# Patient Record
Sex: FEMALE | Race: WHITE | NOT HISPANIC OR LATINO | Employment: FULL TIME | URBAN - METROPOLITAN AREA
[De-identification: names, ages, dates, MRNs, and addresses within clinical notes are randomized per-mention and may not be internally consistent; named-entity substitution may affect disease eponyms.]

---

## 2023-12-08 ENCOUNTER — LAB REQUISITION (OUTPATIENT)
Dept: LAB | Facility: HOSPITAL | Age: 33
End: 2023-12-08

## 2023-12-08 DIAGNOSIS — R30.0 DYSURIA: ICD-10-CM

## 2023-12-08 PROCEDURE — 87086 URINE CULTURE/COLONY COUNT: CPT

## 2023-12-08 PROCEDURE — 87186 SC STD MICRODIL/AGAR DIL: CPT

## 2023-12-11 LAB — BACTERIA UR CULT: ABNORMAL

## 2024-02-13 ENCOUNTER — LAB (OUTPATIENT)
Dept: LAB | Facility: LAB | Age: 34
End: 2024-02-13
Payer: MEDICARE

## 2024-02-13 ENCOUNTER — OFFICE VISIT (OUTPATIENT)
Dept: OBSTETRICS AND GYNECOLOGY | Facility: CLINIC | Age: 34
End: 2024-02-13
Payer: MEDICARE

## 2024-02-13 VITALS
SYSTOLIC BLOOD PRESSURE: 100 MMHG | WEIGHT: 144 LBS | DIASTOLIC BLOOD PRESSURE: 60 MMHG | HEIGHT: 65 IN | BODY MASS INDEX: 23.99 KG/M2

## 2024-02-13 DIAGNOSIS — N92.6 IRREGULAR MENSES: ICD-10-CM

## 2024-02-13 DIAGNOSIS — R10.2 PELVIC PAIN: ICD-10-CM

## 2024-02-13 DIAGNOSIS — N92.6 IRREGULAR MENSES: Primary | ICD-10-CM

## 2024-02-13 PROBLEM — F33.2 SEVERE EPISODE OF RECURRENT MAJOR DEPRESSIVE DISORDER, WITHOUT PSYCHOTIC FEATURES (MULTI): Status: RESOLVED | Noted: 2018-01-08 | Resolved: 2024-02-13

## 2024-02-13 PROBLEM — N39.0 RECURRENT UTI: Status: RESOLVED | Noted: 2017-05-09 | Resolved: 2024-02-13

## 2024-02-13 PROBLEM — F32.A DEPRESSION: Status: ACTIVE | Noted: 2017-10-21

## 2024-02-13 PROBLEM — M79.18 MYOFASCIAL PAIN: Status: RESOLVED | Noted: 2017-03-27 | Resolved: 2024-02-13

## 2024-02-13 PROBLEM — F10.929 ALCOHOL INTOXICATION (CMS-HCC): Status: RESOLVED | Noted: 2024-02-13 | Resolved: 2024-02-13

## 2024-02-13 PROBLEM — F32.9 MAJOR DEPRESSIVE DISORDER: Status: ACTIVE | Noted: 2024-02-13

## 2024-02-13 PROBLEM — F41.1 GAD (GENERALIZED ANXIETY DISORDER): Status: ACTIVE | Noted: 2018-01-08

## 2024-02-13 PROBLEM — G89.4 CHRONIC PAIN ASSOCIATED WITH SIGNIFICANT PSYCHOSOCIAL DYSFUNCTION: Status: ACTIVE | Noted: 2017-03-27

## 2024-02-13 PROBLEM — F19.939: Status: RESOLVED | Noted: 2017-10-21 | Resolved: 2024-02-13

## 2024-02-13 PROBLEM — M79.7 FIBROMYALGIA: Status: ACTIVE | Noted: 2017-03-27

## 2024-02-13 LAB — TSH SERPL-ACNC: 1.45 MIU/L (ref 0.44–3.98)

## 2024-02-13 PROCEDURE — 83002 ASSAY OF GONADOTROPIN (LH): CPT

## 2024-02-13 PROCEDURE — 99203 OFFICE O/P NEW LOW 30 MIN: CPT | Performed by: NURSE PRACTITIONER

## 2024-02-13 PROCEDURE — 36415 COLL VENOUS BLD VENIPUNCTURE: CPT

## 2024-02-13 PROCEDURE — 1036F TOBACCO NON-USER: CPT | Performed by: NURSE PRACTITIONER

## 2024-02-13 PROCEDURE — 83001 ASSAY OF GONADOTROPIN (FSH): CPT

## 2024-02-13 PROCEDURE — 84146 ASSAY OF PROLACTIN: CPT

## 2024-02-13 PROCEDURE — 82670 ASSAY OF TOTAL ESTRADIOL: CPT

## 2024-02-13 RX ORDER — PHENYLPROPANOLAMINE/CLEMASTINE 75-1.34MG
400 TABLET, EXTENDED RELEASE ORAL DAILY PRN
COMMUNITY

## 2024-02-13 RX ORDER — QUETIAPINE FUMARATE 25 MG/1
25-50 TABLET, FILM COATED ORAL NIGHTLY PRN
COMMUNITY

## 2024-02-13 RX ORDER — PRAZOSIN HYDROCHLORIDE 1 MG/1
CAPSULE ORAL
COMMUNITY

## 2024-02-13 RX ORDER — FLUOXETINE HYDROCHLORIDE 60 MG/1
60 TABLET, FILM COATED ORAL; ORAL
COMMUNITY

## 2024-02-13 ASSESSMENT — ENCOUNTER SYMPTOMS
SLEEP DISTURBANCE: 1
CARDIOVASCULAR NEGATIVE: 1
NEUROLOGICAL NEGATIVE: 1
ENDOCRINE NEGATIVE: 1
FATIGUE: 1
EYES NEGATIVE: 1
MUSCULOSKELETAL NEGATIVE: 1
HEMATOLOGIC/LYMPHATIC NEGATIVE: 1
GASTROINTESTINAL NEGATIVE: 1
ALLERGIC/IMMUNOLOGIC NEGATIVE: 1
RESPIRATORY NEGATIVE: 1

## 2024-02-13 NOTE — PROGRESS NOTES
"Chief Complaint    Menstrual Problem        HPI    NEW PATIENT - NO PERIOD 10/23/2023 -  01/30 2024 - BEFORE OCTOBER SHE HAD NEVER HAD A MISSED PERIOD AND THEY WERE LIGHT   Last edited by KELSEY Harding-RICH on 2/13/2024 12:46 PM.         33 y.o. G0 female who presents for evaluation of irregular cycles.  She is new with the practice.   States menstrual cycles are typically regular every 28 days, lasting 2-4 days.   She missed period between October-Jan. Negative pregnancy tests.   Had a cycle on 01/30/2024, heavier than usual.   Reports dysmenorrhea. Occasional pelvic pain outside of menses.   She is sexually active with . Denies dyspareunia.  Condoms for contraception. Was on birth control pills in the past.   Reports fatigue. Denies acne, abnormal hair growth, galactorrhea.   PMH: Anxiety, depression, fibromyalgia  Family h/o breast cancer: Mother, maternal aunt - CDH-1 gene+. Patient has had negative BRCA testing.     /60   Ht 1.651 m (5' 5\")   Wt 65.3 kg (144 lb)   LMP 01/30/2024 (Approximate)   BMI 23.96 kg/m²      Review of Systems   Constitutional:  Positive for fatigue.   HENT: Negative.     Eyes: Negative.    Respiratory: Negative.     Cardiovascular: Negative.    Gastrointestinal: Negative.    Endocrine: Negative.    Genitourinary: Negative.    Musculoskeletal: Negative.    Skin: Negative.    Allergic/Immunologic: Negative.    Neurological: Negative.    Hematological: Negative.    Psychiatric/Behavioral:  Positive for sleep disturbance.         +Anxiety, depression   All other systems reviewed and are negative.       Physical Exam  Constitutional:       Appearance: Normal appearance.   HENT:      Head: Normocephalic.      Nose: Nose normal.   Pulmonary:      Effort: Pulmonary effort is normal.   Abdominal:      General: Abdomen is flat.      Palpations: Abdomen is soft.   Genitourinary:     General: Normal vulva.      Vagina: Normal.      Cervix: Normal.      Uterus: Normal.     "   Adnexa: Right adnexa normal and left adnexa normal.      Rectum: Normal.   Musculoskeletal:         General: Normal range of motion.      Cervical back: Normal range of motion and neck supple.   Skin:     General: Skin is warm and dry.   Neurological:      Mental Status: She is alert.   Psychiatric:         Mood and Affect: Mood normal.         Behavior: Behavior normal.        Assessment/Plan:  1. Irregular menses  -Recent normal menses.  -Orders for blood work for further evaluation:  - Estradiol; Future  - FSH & LH; Future  - Prolactin; Future  - TSH with reflex to Free T4 if abnormal; Future  -Will notify of results.  -Follow up 1-2 weeks to review results and for annual/pap test.     2. Pelvic pain  -Ordered: US PELVIS TRANSABDOMINAL WITH TRANSVAGINAL; Future   -Will notify of results.

## 2024-02-14 LAB
ESTRADIOL SERPL-MCNC: 98 PG/ML
FSH SERPL-ACNC: 6.9 IU/L
LH SERPL-ACNC: 10 IU/L
PROLACTIN SERPL-MCNC: 5 UG/L (ref 3–20)

## 2024-02-20 ENCOUNTER — HOSPITAL ENCOUNTER (OUTPATIENT)
Dept: RADIOLOGY | Facility: HOSPITAL | Age: 34
Discharge: HOME | End: 2024-02-20
Payer: MEDICARE

## 2024-02-20 DIAGNOSIS — R10.2 PELVIC PAIN: ICD-10-CM

## 2024-02-20 PROCEDURE — 76856 US EXAM PELVIC COMPLETE: CPT

## 2024-02-27 ENCOUNTER — OFFICE VISIT (OUTPATIENT)
Dept: OBSTETRICS AND GYNECOLOGY | Facility: CLINIC | Age: 34
End: 2024-02-27
Payer: MEDICARE

## 2024-02-27 VITALS
BODY MASS INDEX: 23.82 KG/M2 | SYSTOLIC BLOOD PRESSURE: 100 MMHG | DIASTOLIC BLOOD PRESSURE: 62 MMHG | HEIGHT: 65 IN | WEIGHT: 143 LBS

## 2024-02-27 DIAGNOSIS — Z00.00 HEALTHCARE MAINTENANCE: Primary | ICD-10-CM

## 2024-02-27 DIAGNOSIS — Z12.4 CERVICAL CANCER SCREENING: ICD-10-CM

## 2024-02-27 DIAGNOSIS — Z01.419 WELL WOMAN EXAM WITH ROUTINE GYNECOLOGICAL EXAM: ICD-10-CM

## 2024-02-27 PROCEDURE — 87624 HPV HI-RISK TYP POOLED RSLT: CPT

## 2024-02-27 PROCEDURE — 88175 CYTOPATH C/V AUTO FLUID REDO: CPT

## 2024-02-27 PROCEDURE — 1036F TOBACCO NON-USER: CPT | Performed by: NURSE PRACTITIONER

## 2024-02-27 PROCEDURE — 99395 PREV VISIT EST AGE 18-39: CPT | Performed by: NURSE PRACTITIONER

## 2024-02-27 ASSESSMENT — ENCOUNTER SYMPTOMS
MUSCULOSKELETAL NEGATIVE: 1
DECREASED CONCENTRATION: 1
ENDOCRINE NEGATIVE: 1
NERVOUS/ANXIOUS: 1
SLEEP DISTURBANCE: 1
FATIGUE: 1
EYES NEGATIVE: 1
GASTROINTESTINAL NEGATIVE: 1
HEMATOLOGIC/LYMPHATIC NEGATIVE: 1
ALLERGIC/IMMUNOLOGIC NEGATIVE: 1
NEUROLOGICAL NEGATIVE: 1
CARDIOVASCULAR NEGATIVE: 1
RESPIRATORY NEGATIVE: 1

## 2024-02-27 NOTE — PROGRESS NOTES
"Chief Complaint    Annual Exam        HPI    ANNUAL - STILL FEELING MISERABLE - TEST RESULTS    PAP - 6 YEARS AGO  MAMMO - NEVER  DEXA - NEVER  COLON - NEVER  Last edited by Gricelda Grewal MA on 2/27/2024  2:50 PM.         33 y.o. G0 female presents for annual well woman exam.   She was seen in office on 02/13 after skipping a period from October-January.   Patient's menstrual cycles are typically regular every 28 days, lasting 2-4 days.   Return of period on 01/30/2024 which was heavier than usual.   She had labs including TSH, prolactin, FSH, estradiol which were normal. Pelvic US on 02/20 was normal.  Concerns regarding worsening symptoms of extreme fatigue, mood changes/swings, brain fog, sweating. She sees psychiatrist who manages her anxiety, depression. Currently on Prozac and Seroquel. Her psych provider suggested symptoms may be related to PMDD however, though these symptoms were typically worse prior to her period but now feels this symptoms all the time and not just cyclically.    She is sexually active with . Denies dyspareunia.   She denies any breast concerns.   Pap hx normal: Last pap ~2018 per patient.   Denies pelvic pain.  Denies abnormal vaginal symptoms.  Denies urinary or bowel concerns.   She is non-smoker  PMH: Anxiety, depression, fibromyalgia   Family h/o breast cancer: Mother, maternal aunt - CDH-1 gene+. Patient has had negative BRCA testing.    Family h/o GYN cancer: None  Family h/o colon cancer: None    /62   Ht 1.651 m (5' 5\")   Wt 64.9 kg (143 lb)   LMP 01/30/2024 (Approximate)   BMI 23.80 kg/m²      Review of Systems   Constitutional:  Positive for fatigue.   HENT: Negative.     Eyes: Negative.    Respiratory: Negative.     Cardiovascular: Negative.    Gastrointestinal: Negative.    Endocrine: Negative.    Genitourinary: Negative.    Musculoskeletal: Negative.    Skin: Negative.    Allergic/Immunologic: Negative.    Neurological: Negative.    Hematological: " Negative.    Psychiatric/Behavioral:  Positive for decreased concentration and sleep disturbance. The patient is nervous/anxious.    All other systems reviewed and are negative.       Physical Exam  Constitutional:       Appearance: Normal appearance.   HENT:      Head: Normocephalic.      Nose: Nose normal.   Cardiovascular:      Rate and Rhythm: Normal rate and regular rhythm.   Pulmonary:      Effort: Pulmonary effort is normal.      Breath sounds: Normal breath sounds.   Chest:   Breasts:     Right: Normal.      Left: Normal.   Abdominal:      General: Abdomen is flat.      Palpations: Abdomen is soft.   Genitourinary:     General: Normal vulva.      Vagina: Normal.      Cervix: Normal.      Uterus: Normal.       Adnexa: Right adnexa normal and left adnexa normal.      Rectum: Normal.      Comments: Pap collected today  Musculoskeletal:         General: Normal range of motion.      Cervical back: Normal range of motion and neck supple.   Skin:     General: Skin is warm and dry.   Neurological:      Mental Status: She is alert.   Psychiatric:         Mood and Affect: Mood normal.         Behavior: Behavior normal.       Assessment/Plan:   1. Well woman exam with routine gynecological exam  -Pap test collected today.   -Self breast awareness exams reviewed.  -Advised yearly well woman exams.   -Follow up sooner if needed.     2. Healthcare maintenance  -Discussed symptoms in detail today. Though some of these symptoms can be related to PMS/PMDD, given that they are not just cyclic would recommend establishing care with PCP to further assess other reasons. Continue regular follow up with psychiatrist as well.  - Referral to Primary Care; Future  -Can consider trying COCs if she desires in the future to see if this helps. Patient will follow up as needed.     3. Cervical cancer screening  - THINPREP PAP

## 2024-03-11 LAB
CYTOLOGY CMNT CVX/VAG CYTO-IMP: NORMAL
HPV HR 12 DNA GENITAL QL NAA+PROBE: NEGATIVE
HPV HR GENOTYPES PNL CVX NAA+PROBE: NEGATIVE
HPV16 DNA SPEC QL NAA+PROBE: NEGATIVE
HPV18 DNA SPEC QL NAA+PROBE: NEGATIVE
LAB AP HPV GENOTYPE QUESTION: YES
LAB AP HPV HR: NORMAL
LABORATORY COMMENT REPORT: NORMAL
PATH REPORT.TOTAL CANCER: NORMAL

## 2024-03-14 ENCOUNTER — OFFICE VISIT (OUTPATIENT)
Dept: PRIMARY CARE | Facility: CLINIC | Age: 34
End: 2024-03-14
Payer: MEDICARE

## 2024-03-14 ENCOUNTER — LAB (OUTPATIENT)
Dept: LAB | Facility: LAB | Age: 34
End: 2024-03-14
Payer: MEDICARE

## 2024-03-14 VITALS
SYSTOLIC BLOOD PRESSURE: 112 MMHG | DIASTOLIC BLOOD PRESSURE: 75 MMHG | HEIGHT: 65 IN | OXYGEN SATURATION: 98 % | WEIGHT: 148 LBS | HEART RATE: 77 BPM | BODY MASS INDEX: 24.66 KG/M2

## 2024-03-14 DIAGNOSIS — Z13.220 LIPID SCREENING: ICD-10-CM

## 2024-03-14 DIAGNOSIS — F33.1 MODERATE EPISODE OF RECURRENT MAJOR DEPRESSIVE DISORDER (MULTI): Primary | ICD-10-CM

## 2024-03-14 DIAGNOSIS — R53.83 OTHER FATIGUE: Primary | ICD-10-CM

## 2024-03-14 DIAGNOSIS — F33.1 MODERATE EPISODE OF RECURRENT MAJOR DEPRESSIVE DISORDER (MULTI): ICD-10-CM

## 2024-03-14 DIAGNOSIS — D64.9 ANEMIA, UNSPECIFIED TYPE: ICD-10-CM

## 2024-03-14 LAB
ALBUMIN SERPL BCP-MCNC: 3.8 G/DL (ref 3.4–5)
ALP SERPL-CCNC: 49 U/L (ref 33–110)
ALT SERPL W P-5'-P-CCNC: 17 U/L (ref 7–45)
ANION GAP SERPL CALC-SCNC: 11 MMOL/L (ref 10–20)
AST SERPL W P-5'-P-CCNC: 21 U/L (ref 9–39)
BASOPHILS # BLD AUTO: 0.03 X10*3/UL (ref 0–0.1)
BASOPHILS NFR BLD AUTO: 0.6 %
BILIRUB SERPL-MCNC: 0.7 MG/DL (ref 0–1.2)
BUN SERPL-MCNC: 14 MG/DL (ref 6–23)
CALCIUM SERPL-MCNC: 8.1 MG/DL (ref 8.6–10.3)
CHLORIDE SERPL-SCNC: 108 MMOL/L (ref 98–107)
CHOLEST SERPL-MCNC: 119 MG/DL (ref 0–199)
CHOLESTEROL/HDL RATIO: 2.1
CO2 SERPL-SCNC: 26 MMOL/L (ref 21–32)
CREAT SERPL-MCNC: 0.89 MG/DL (ref 0.5–1.05)
EGFRCR SERPLBLD CKD-EPI 2021: 87 ML/MIN/1.73M*2
EOSINOPHIL # BLD AUTO: 0.22 X10*3/UL (ref 0–0.7)
EOSINOPHIL NFR BLD AUTO: 4.3 %
ERYTHROCYTE [DISTWIDTH] IN BLOOD BY AUTOMATED COUNT: 12.4 % (ref 11.5–14.5)
GLUCOSE SERPL-MCNC: 95 MG/DL (ref 74–99)
HCT VFR BLD AUTO: 37.2 % (ref 36–46)
HDLC SERPL-MCNC: 57.4 MG/DL
HGB BLD-MCNC: 11.9 G/DL (ref 12–16)
IMM GRANULOCYTES # BLD AUTO: 0.01 X10*3/UL (ref 0–0.7)
IMM GRANULOCYTES NFR BLD AUTO: 0.2 % (ref 0–0.9)
LDLC SERPL CALC-MCNC: 52 MG/DL
LYMPHOCYTES # BLD AUTO: 2 X10*3/UL (ref 1.2–4.8)
LYMPHOCYTES NFR BLD AUTO: 38.8 %
MCH RBC QN AUTO: 31 PG (ref 26–34)
MCHC RBC AUTO-ENTMCNC: 32 G/DL (ref 32–36)
MCV RBC AUTO: 97 FL (ref 80–100)
MONOCYTES # BLD AUTO: 0.43 X10*3/UL (ref 0.1–1)
MONOCYTES NFR BLD AUTO: 8.3 %
NEUTROPHILS # BLD AUTO: 2.47 X10*3/UL (ref 1.2–7.7)
NEUTROPHILS NFR BLD AUTO: 47.8 %
NON HDL CHOLESTEROL: 62 MG/DL (ref 0–149)
NRBC BLD-RTO: 0 /100 WBCS (ref 0–0)
PLATELET # BLD AUTO: 243 X10*3/UL (ref 150–450)
POTASSIUM SERPL-SCNC: 4.5 MMOL/L (ref 3.5–5.3)
PROT SERPL-MCNC: 5.9 G/DL (ref 6.4–8.2)
RBC # BLD AUTO: 3.84 X10*6/UL (ref 4–5.2)
SODIUM SERPL-SCNC: 140 MMOL/L (ref 136–145)
TRIGL SERPL-MCNC: 50 MG/DL (ref 0–149)
VLDL: 10 MG/DL (ref 0–40)
WBC # BLD AUTO: 5.2 X10*3/UL (ref 4.4–11.3)

## 2024-03-14 PROCEDURE — 83550 IRON BINDING TEST: CPT

## 2024-03-14 PROCEDURE — 99214 OFFICE O/P EST MOD 30 MIN: CPT | Performed by: NURSE PRACTITIONER

## 2024-03-14 PROCEDURE — 82728 ASSAY OF FERRITIN: CPT

## 2024-03-14 PROCEDURE — 80053 COMPREHEN METABOLIC PANEL: CPT

## 2024-03-14 PROCEDURE — 1036F TOBACCO NON-USER: CPT | Performed by: NURSE PRACTITIONER

## 2024-03-14 PROCEDURE — 82607 VITAMIN B-12: CPT

## 2024-03-14 PROCEDURE — 85025 COMPLETE CBC W/AUTO DIFF WBC: CPT

## 2024-03-14 PROCEDURE — 36415 COLL VENOUS BLD VENIPUNCTURE: CPT

## 2024-03-14 PROCEDURE — 80061 LIPID PANEL: CPT

## 2024-03-14 PROCEDURE — 83540 ASSAY OF IRON: CPT

## 2024-03-14 PROCEDURE — 82306 VITAMIN D 25 HYDROXY: CPT

## 2024-03-14 ASSESSMENT — PATIENT HEALTH QUESTIONNAIRE - PHQ9
SUM OF ALL RESPONSES TO PHQ9 QUESTIONS 1 AND 2: 4
6. FEELING BAD ABOUT YOURSELF - OR THAT YOU ARE A FAILURE OR HAVE LET YOURSELF OR YOUR FAMILY DOWN: MORE THAN HALF THE DAYS
1. LITTLE INTEREST OR PLEASURE IN DOING THINGS: MORE THAN HALF THE DAYS
10. IF YOU CHECKED OFF ANY PROBLEMS, HOW DIFFICULT HAVE THESE PROBLEMS MADE IT FOR YOU TO DO YOUR WORK, TAKE CARE OF THINGS AT HOME, OR GET ALONG WITH OTHER PEOPLE: VERY DIFFICULT
SUM OF ALL RESPONSES TO PHQ QUESTIONS 1-9: 23
7. TROUBLE CONCENTRATING ON THINGS, SUCH AS READING THE NEWSPAPER OR WATCHING TELEVISION: NEARLY EVERY DAY
8. MOVING OR SPEAKING SO SLOWLY THAT OTHER PEOPLE COULD HAVE NOTICED. OR THE OPPOSITE, BEING SO FIGETY OR RESTLESS THAT YOU HAVE BEEN MOVING AROUND A LOT MORE THAN USUAL: NEARLY EVERY DAY
SUM OF ALL RESPONSES TO PHQ QUESTIONS 1-9: 23
9. THOUGHTS THAT YOU WOULD BE BETTER OFF DEAD, OR OF HURTING YOURSELF: MORE THAN HALF THE DAYS
1. LITTLE INTEREST OR PLEASURE IN DOING THINGS: MORE THAN HALF THE DAYS
3. TROUBLE FALLING OR STAYING ASLEEP OR SLEEPING TOO MUCH: NEARLY EVERY DAY
3. TROUBLE FALLING OR STAYING ASLEEP OR SLEEPING TOO MUCH: NEARLY EVERY DAY
2. FEELING DOWN, DEPRESSED OR HOPELESS: MORE THAN HALF THE DAYS
10. IF YOU CHECKED OFF ANY PROBLEMS, HOW DIFFICULT HAVE THESE PROBLEMS MADE IT FOR YOU TO DO YOUR WORK, TAKE CARE OF THINGS AT HOME, OR GET ALONG WITH OTHER PEOPLE: SOMEWHAT DIFFICULT
7. TROUBLE CONCENTRATING ON THINGS, SUCH AS READING THE NEWSPAPER OR WATCHING TELEVISION: NEARLY EVERY DAY
SUM OF ALL RESPONSES TO PHQ9 QUESTIONS 1 AND 2: 4
9. THOUGHTS THAT YOU WOULD BE BETTER OFF DEAD, OR OF HURTING YOURSELF: MORE THAN HALF THE DAYS
6. FEELING BAD ABOUT YOURSELF - OR THAT YOU ARE A FAILURE OR HAVE LET YOURSELF OR YOUR FAMILY DOWN: MORE THAN HALF THE DAYS
8. MOVING OR SPEAKING SO SLOWLY THAT OTHER PEOPLE COULD HAVE NOTICED. OR THE OPPOSITE, BEING SO FIGETY OR RESTLESS THAT YOU HAVE BEEN MOVING AROUND A LOT MORE THAN USUAL: NEARLY EVERY DAY
5. POOR APPETITE OR OVEREATING: NEARLY EVERY DAY
5. POOR APPETITE OR OVEREATING: NEARLY EVERY DAY
4. FEELING TIRED OR HAVING LITTLE ENERGY: NEARLY EVERY DAY
4. FEELING TIRED OR HAVING LITTLE ENERGY: NEARLY EVERY DAY
2. FEELING DOWN, DEPRESSED OR HOPELESS: MORE THAN HALF THE DAYS

## 2024-03-14 NOTE — PROGRESS NOTES
"Subjective   Patient ID: Mahi Cuba is a 33 y.o. female who presents for New Patient Visit (NPV to establish new primary, OB RULED OUT ANY REPRODUCTIVE ISSUES,wants to figure out cause of Extreme fatigue, brain fog, and anxiety /).    33-year-old female here to establish care.  She has not seen a PCP.  Medical history reviewed.  She was having no period x 3 months saw GYN who did hormone levels which all came back in a normal range therefore she requested patient follow-up with primary care provider.  Patient states she has a history of PMDD- psychiatrist recently increased prozac. Depression & anxiety. Was seeing a therapist but no longer.   HX fibromyalgia- off meds for this. The last couple months having a lot of symptoms:  Feels like she is not thinking clearly, simple mistakes, calling things by a dif name/work, feels like she cannot figure out simple tasks that she was able to do previously.   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 Yet her psychiatrist states her current s&s are related.  She recently had an increase in her dose of sertraline approximately 10 days ago.  She states that maybe she is feeling a mild improvement in motivation.  Has been calling off work due to the fatigue that is worsening.   Feels disconnected  Did chronic pain program through [facility] in the past- restarted the PT routine.   Decreased appetite  \"Making\" herself do exercise.   She does not quite understand how depression can cause the symptoms.  And wants to make sure that there is nothing medically underlying         Review of Systems    Objective   /75   Pulse 77   Ht 1.651 m (5' 5\")   Wt 67.1 kg (148 lb)   LMP 01/30/2024 (Approximate)   SpO2 98%   BMI 24.63 kg/m²     Physical Exam  Constitutional:       Appearance: Normal appearance. "   Cardiovascular:      Rate and Rhythm: Normal rate and regular rhythm.   Pulmonary:      Effort: Pulmonary effort is normal.      Breath sounds: Normal breath sounds.   Musculoskeletal:      Cervical back: Normal range of motion and neck supple.   Neurological:      Mental Status: She is alert and oriented to person, place, and time.   Psychiatric:         Mood and Affect: Mood normal.      Comments: Flat affect; looks fatigued       Assessment/Plan   Diagnoses and all orders for this visit:  Moderate episode of recurrent major depressive disorder (CMS/HCC)  Comments:  Just to have a complete workup we will do CBC, CMP fasting lipid panel vitamin D and B12.  Her TSH was normal.  She needs to follow-up with her psychiatrist  Orders:  -     Vitamin B12; Future  -     Vitamin D 25-Hydroxy,Total (for eval of Vitamin D levels); Future  -     CBC and Auto Differential; Future  -     Comprehensive Metabolic Panel; Future  -     Lipid Panel; Future  Lipid screening  -     Lipid Panel; Future    Her symptoms and her appearance are really pointing more toward a psychiatric illness rather than a medical concern however I want to make sure not to assume this so therefore I will update blood work to make sure kidneys liver and electrolytes are all looking good.  I reviewed her previous blood work in the chart her alcohol level has been elevated in the past, TSH normal, hormone panel normal.   I tried to educate her on depression and this current symptoms she is experiencing all correlate closely to depression

## 2024-03-14 NOTE — PATIENT INSTRUCTIONS
B12 sublingual 1000mcg in am daily  Vitamin D 3 5,000 units a day with food  Please call your psychiatrist for your worsening depression

## 2024-03-15 LAB
25(OH)D3 SERPL-MCNC: 31 NG/ML (ref 30–100)
FERRITIN SERPL-MCNC: 26 NG/ML (ref 8–150)
IRON SATN MFR SERPL: 17 % (ref 25–45)
IRON SERPL-MCNC: 52 UG/DL (ref 35–150)
TIBC SERPL-MCNC: 311 UG/DL (ref 240–445)
UIBC SERPL-MCNC: 259 UG/DL (ref 110–370)
VIT B12 SERPL-MCNC: 368 PG/ML (ref 211–911)

## 2024-03-18 ENCOUNTER — TELEPHONE (OUTPATIENT)
Dept: PRIMARY CARE | Facility: CLINIC | Age: 34
End: 2024-03-18
Payer: MEDICARE

## 2024-08-08 ENCOUNTER — TELEPHONE (OUTPATIENT)
Dept: PRIMARY CARE | Facility: CLINIC | Age: 34
End: 2024-08-08
Payer: MEDICARE

## 2024-08-08 DIAGNOSIS — E55.9 VITAMIN D DEFICIENCY: ICD-10-CM

## 2024-08-08 DIAGNOSIS — E44.1 MILD PROTEIN-CALORIE MALNUTRITION (MULTI): ICD-10-CM

## 2024-08-08 DIAGNOSIS — D51.9 ANEMIA DUE TO VITAMIN B12 DEFICIENCY, UNSPECIFIED B12 DEFICIENCY TYPE: Primary | ICD-10-CM

## 2024-08-12 ENCOUNTER — LAB (OUTPATIENT)
Dept: LAB | Facility: LAB | Age: 34
End: 2024-08-12
Payer: MEDICARE

## 2024-08-12 DIAGNOSIS — E55.9 VITAMIN D DEFICIENCY: ICD-10-CM

## 2024-08-12 DIAGNOSIS — D51.9 ANEMIA DUE TO VITAMIN B12 DEFICIENCY, UNSPECIFIED B12 DEFICIENCY TYPE: ICD-10-CM

## 2024-08-12 DIAGNOSIS — E44.1 MILD PROTEIN-CALORIE MALNUTRITION (MULTI): ICD-10-CM

## 2024-08-12 LAB
BASOPHILS # BLD AUTO: 0.04 X10*3/UL (ref 0–0.1)
BASOPHILS NFR BLD AUTO: 0.8 %
EOSINOPHIL # BLD AUTO: 0.12 X10*3/UL (ref 0–0.7)
EOSINOPHIL NFR BLD AUTO: 2.4 %
ERYTHROCYTE [DISTWIDTH] IN BLOOD BY AUTOMATED COUNT: 12.2 % (ref 11.5–14.5)
HCT VFR BLD AUTO: 41.7 % (ref 36–46)
HGB BLD-MCNC: 13.5 G/DL (ref 12–16)
IMM GRANULOCYTES # BLD AUTO: 0.01 X10*3/UL (ref 0–0.7)
IMM GRANULOCYTES NFR BLD AUTO: 0.2 % (ref 0–0.9)
LYMPHOCYTES # BLD AUTO: 1.67 X10*3/UL (ref 1.2–4.8)
LYMPHOCYTES NFR BLD AUTO: 32.8 %
MCH RBC QN AUTO: 31.7 PG (ref 26–34)
MCHC RBC AUTO-ENTMCNC: 32.4 G/DL (ref 32–36)
MCV RBC AUTO: 98 FL (ref 80–100)
MONOCYTES # BLD AUTO: 0.31 X10*3/UL (ref 0.1–1)
MONOCYTES NFR BLD AUTO: 6.1 %
NEUTROPHILS # BLD AUTO: 2.94 X10*3/UL (ref 1.2–7.7)
NEUTROPHILS NFR BLD AUTO: 57.7 %
NRBC BLD-RTO: 0 /100 WBCS (ref 0–0)
PLATELET # BLD AUTO: 261 X10*3/UL (ref 150–450)
RBC # BLD AUTO: 4.26 X10*6/UL (ref 4–5.2)
WBC # BLD AUTO: 5.1 X10*3/UL (ref 4.4–11.3)

## 2024-08-12 PROCEDURE — 82607 VITAMIN B-12: CPT

## 2024-08-12 PROCEDURE — 83550 IRON BINDING TEST: CPT

## 2024-08-12 PROCEDURE — 80053 COMPREHEN METABOLIC PANEL: CPT

## 2024-08-12 PROCEDURE — 83540 ASSAY OF IRON: CPT

## 2024-08-12 PROCEDURE — 82306 VITAMIN D 25 HYDROXY: CPT

## 2024-08-13 ENCOUNTER — PATIENT MESSAGE (OUTPATIENT)
Dept: OBSTETRICS AND GYNECOLOGY | Facility: CLINIC | Age: 34
End: 2024-08-13
Payer: MEDICARE

## 2024-08-13 DIAGNOSIS — F32.81 PMDD (PREMENSTRUAL DYSPHORIC DISORDER): Primary | ICD-10-CM

## 2024-08-13 LAB
25(OH)D3 SERPL-MCNC: 58 NG/ML (ref 30–100)
ALBUMIN SERPL BCP-MCNC: 3.9 G/DL (ref 3.4–5)
ALP SERPL-CCNC: 49 U/L (ref 33–110)
ALT SERPL W P-5'-P-CCNC: 14 U/L (ref 7–45)
ANION GAP SERPL CALC-SCNC: 10 MMOL/L (ref 10–20)
AST SERPL W P-5'-P-CCNC: 15 U/L (ref 9–39)
BILIRUB SERPL-MCNC: 0.7 MG/DL (ref 0–1.2)
BUN SERPL-MCNC: 13 MG/DL (ref 6–23)
CALCIUM SERPL-MCNC: 9 MG/DL (ref 8.6–10.6)
CHLORIDE SERPL-SCNC: 106 MMOL/L (ref 98–107)
CO2 SERPL-SCNC: 27 MMOL/L (ref 21–32)
CREAT SERPL-MCNC: 0.83 MG/DL (ref 0.5–1.05)
EGFRCR SERPLBLD CKD-EPI 2021: >90 ML/MIN/1.73M*2
GLUCOSE SERPL-MCNC: 89 MG/DL (ref 74–99)
IRON SATN MFR SERPL: 13 % (ref 25–45)
IRON SERPL-MCNC: 41 UG/DL (ref 35–150)
POTASSIUM SERPL-SCNC: 4.7 MMOL/L (ref 3.5–5.3)
PROT SERPL-MCNC: 6.5 G/DL (ref 6.4–8.2)
SODIUM SERPL-SCNC: 138 MMOL/L (ref 136–145)
TIBC SERPL-MCNC: 309 UG/DL (ref 240–445)
UIBC SERPL-MCNC: 268 UG/DL (ref 110–370)
VIT B12 SERPL-MCNC: 1396 PG/ML (ref 211–911)

## 2024-08-13 RX ORDER — DROSPIRENONE AND ETHINYL ESTRADIOL 0.02-3(28)
1 KIT ORAL DAILY
Qty: 90 TABLET | Refills: 1 | Status: SHIPPED | OUTPATIENT
Start: 2024-08-13 | End: 2025-08-13

## 2024-08-14 ENCOUNTER — TELEPHONE (OUTPATIENT)
Dept: PRIMARY CARE | Facility: CLINIC | Age: 34
End: 2024-08-14
Payer: COMMERCIAL

## 2024-09-04 ENCOUNTER — APPOINTMENT (OUTPATIENT)
Dept: PRIMARY CARE | Facility: CLINIC | Age: 34
End: 2024-09-04
Payer: COMMERCIAL

## 2024-09-04 VITALS
WEIGHT: 155 LBS | DIASTOLIC BLOOD PRESSURE: 77 MMHG | OXYGEN SATURATION: 97 % | HEIGHT: 65 IN | SYSTOLIC BLOOD PRESSURE: 115 MMHG | HEART RATE: 78 BPM | BODY MASS INDEX: 25.83 KG/M2

## 2024-09-04 DIAGNOSIS — D50.8 IRON DEFICIENCY ANEMIA SECONDARY TO INADEQUATE DIETARY IRON INTAKE: Primary | ICD-10-CM

## 2024-09-04 DIAGNOSIS — G47.00 INSOMNIA, UNSPECIFIED TYPE: ICD-10-CM

## 2024-09-04 PROCEDURE — 99214 OFFICE O/P EST MOD 30 MIN: CPT | Performed by: NURSE PRACTITIONER

## 2024-09-04 PROCEDURE — 3008F BODY MASS INDEX DOCD: CPT | Performed by: NURSE PRACTITIONER

## 2024-09-04 NOTE — PROGRESS NOTES
"Subjective   Patient ID: Mahi Cuba is a 34 y.o. female who presents for Follow-up (Patient would like to discuss iron supplements/injections).    34 year old female here for follow up iron deficiency- started a new supplement 2 weeks ago.  Wondering if it was a good 1.  I reviewed the ingredient label and is very good.  Is a different form of iron then over-the-counter ferrous sulfate.  I reviewed her blood work with her her iron level is good but her TIBC is low she does not understand why she is not absorbing iron.  We talked about dietary habits and nutritional deficiencies coming from the foods we choose to eat.  She is chronically fatigued.  She states this has been going on for years and she does not understand why people cannot find anything wrong  Poor sleep- scheduled a sleep study      Educated on anxiety depression and chronic fatigue syndrome.  She has seen multiple medical providers over the years.  I also discussed with her this could be a chronic anxiety and depression mental health related.         Review of Systems    Objective   /77   Pulse 78   Ht 1.651 m (5' 5\")   Wt 70.3 kg (155 lb)   SpO2 97%   BMI 25.79 kg/m²     Physical Exam  Constitutional:       Appearance: Normal appearance.   Cardiovascular:      Rate and Rhythm: Normal rate and regular rhythm.   Pulmonary:      Effort: Pulmonary effort is normal.   Neurological:      General: No focal deficit present.      Mental Status: She is alert and oriented to person, place, and time. Mental status is at baseline.   Psychiatric:         Mood and Affect: Mood normal.         Behavior: Behavior normal.      Comments: Very pleasant         Assessment/Plan   Diagnoses and all orders for this visit:  Iron deficiency anemia secondary to inadequate dietary iron intake  Comments:  Continue the current supplements she is taking.  Follow-up with repeat blood work after being on the supplement for at least 30 days  Orders:  -     Iron and " TIBC; Future  Insomnia, unspecified type  Comments:  She is requesting a sleep study she has tried over-the-counter sleep aids.  Other orders  -     Follow Up In Primary Care - Established; Future       I offered to try Injectafer she is fearful of trying that since it is new and does not know anything about it.  Await updated lab test and we can discuss further

## 2024-09-23 ENCOUNTER — PATIENT MESSAGE (OUTPATIENT)
Dept: PRIMARY CARE | Facility: CLINIC | Age: 34
End: 2024-09-23
Payer: COMMERCIAL

## 2024-09-23 DIAGNOSIS — G56.03 BILATERAL CARPAL TUNNEL SYNDROME: Primary | ICD-10-CM

## 2024-09-27 ENCOUNTER — OFFICE VISIT (OUTPATIENT)
Dept: SLEEP MEDICINE | Facility: CLINIC | Age: 34
End: 2024-09-27
Payer: COMMERCIAL

## 2024-09-27 VITALS
WEIGHT: 155 LBS | OXYGEN SATURATION: 97 % | DIASTOLIC BLOOD PRESSURE: 74 MMHG | BODY MASS INDEX: 25.83 KG/M2 | HEIGHT: 65 IN | HEART RATE: 80 BPM | RESPIRATION RATE: 16 BRPM | SYSTOLIC BLOOD PRESSURE: 112 MMHG

## 2024-09-27 DIAGNOSIS — R40.0 DAYTIME SLEEPINESS: ICD-10-CM

## 2024-09-27 DIAGNOSIS — G47.52 DREAM ENACTMENT BEHAVIOR: Primary | ICD-10-CM

## 2024-09-27 DIAGNOSIS — G25.81 RLS (RESTLESS LEGS SYNDROME): ICD-10-CM

## 2024-09-27 DIAGNOSIS — J30.2 SEASONAL ALLERGIES: ICD-10-CM

## 2024-09-27 DIAGNOSIS — E83.10 DISORDER OF IRON METABOLISM: ICD-10-CM

## 2024-09-27 PROCEDURE — 99204 OFFICE O/P NEW MOD 45 MIN: CPT | Performed by: INTERNAL MEDICINE

## 2024-09-27 PROCEDURE — 99214 OFFICE O/P EST MOD 30 MIN: CPT | Performed by: INTERNAL MEDICINE

## 2024-09-27 PROCEDURE — 3008F BODY MASS INDEX DOCD: CPT | Performed by: INTERNAL MEDICINE

## 2024-09-27 RX ORDER — FLUTICASONE PROPIONATE 50 MCG
SPRAY, SUSPENSION (ML) NASAL
Qty: 16 G | Refills: 3 | Status: SHIPPED | OUTPATIENT
Start: 2024-09-27

## 2024-09-27 RX ORDER — AZELASTINE 1 MG/ML
2 SPRAY, METERED NASAL DAILY
Qty: 30 ML | Refills: 12 | Status: SHIPPED | OUTPATIENT
Start: 2024-09-27 | End: 2025-09-27

## 2024-09-27 ASSESSMENT — SLEEP AND FATIGUE QUESTIONNAIRES
HOW LIKELY ARE YOU TO NOD OFF OR FALL ASLEEP IN A CAR, WHILE STOPPED FOR A FEW MINUTES IN TRAFFIC: SLIGHT CHANCE OF DOZING
WORRIED_DISTRESSED_DUE_TO_SLEEP: VERY MUCH NOTICEABLE
HOW LIKELY ARE YOU TO NOD OFF OR FALL ASLEEP WHILE SITTING QUIETLY AFTER LUNCH WITHOUT ALCOHOL: SLIGHT CHANCE OF DOZING
SLEEP_PROBLEM_NOTICEABLE_TO_OTHERS: VERY MUCH NOTICEABLE
HOW LIKELY ARE YOU TO NOD OFF OR FALL ASLEEP WHILE WATCHING TV: HIGH CHANCE OF DOZING
HOW LIKELY ARE YOU TO NOD OFF OR FALL ASLEEP WHILE LYING DOWN TO REST IN THE AFTERNOON WHEN CIRCUMSTANCES PERMIT: HIGH CHANCE OF DOZING
HOW LIKELY ARE YOU TO NOD OFF OR FALL ASLEEP WHILE SITTING AND TALKING TO SOMEONE: SLIGHT CHANCE OF DOZING
SLEEP_PROBLEM_INTERFERES_DAILY_ACTIVITIES: VERY MUCH NOTICEABLE
SITING INACTIVE IN A PUBLIC PLACE LIKE A CLASS ROOM OR A MOVIE THEATER: SLIGHT CHANCE OF DOZING
DIFFICULTY_FALLING_ASLEEP: MODERATE
DIFFICULTY_STAYING_ASLEEP: MODERATE
ESS-CHAD TOTAL SCORE: 13
HOW LIKELY ARE YOU TO NOD OFF OR FALL ASLEEP WHEN YOU ARE A PASSENGER IN A CAR FOR AN HOUR WITHOUT A BREAK: WOULD NEVER DOZE
SATISFACTION_WITH_CURRENT_SLEEP_PATTERN: VERY DISSATISFIED
HOW LIKELY ARE YOU TO NOD OFF OR FALL ASLEEP WHILE SITTING AND READING: HIGH CHANCE OF DOZING

## 2024-09-27 ASSESSMENT — PATIENT HEALTH QUESTIONNAIRE - PHQ9
2. FEELING DOWN, DEPRESSED OR HOPELESS: NOT AT ALL
SUM OF ALL RESPONSES TO PHQ9 QUESTIONS 1 AND 2: 0
1. LITTLE INTEREST OR PLEASURE IN DOING THINGS: NOT AT ALL

## 2024-09-27 ASSESSMENT — PAIN SCALES - GENERAL: PAINLEVEL: 3

## 2024-09-27 NOTE — PROGRESS NOTES
"[facility] SLEEP MEDICINE   NEW CONSULT VISIT NOTE    PCP: Mehreen Montilla, APRN-CNP  Referred by: No ref. provider found    HISTORY OF PRESENT ILLNESS     Patient ID: Mahi Cuba is a 34 y.o. female who presents to [facility] for a comprehensive sleep medicine evaluation with concerns of suspected sleep apnea and REM sleep Behavior Disorder (RBD).    Patient is here alone today.  To review, patient's medical history is notable for seasonal allergies,     Patient states that she had PSG done 15 years ago and was told that she had no sleep apnea  Also, patient states that when she started taking OCPs in the last week of Aug 2024, she is now waking up 2x per night for unknown reasons but she feels energetic in daytime. Prior OCP intake, she sleeps thru the night. In addition, she complains of vivid dreams, nightmares, and screaming in her sleep for 2 years and has been getting worse. During the past year, she would wake up unrefreshed and covered with sweats as well as feeling groggy in daytime. Then, she had an episode of acting out dreams 3 months and end up \"karate-chopping\" her  while she was asleep. She was given Prazosin which did not work at all for her nightmares. She takes fluoxetine for depression and anxiety. Tried weighted blanket for anxiety but it made panic attack worse.  She had history of suicidal thoughts when she is not taking any antidepressants.    SLEEP-WAKE SCHEDULE  Bedtime:  12 MN daily  Subjective sleep latency: 15 minutes  Difficulty falling asleep: No  Number of awakenings: Before taking OCP, patient sleeps thru the night. Now on OCP, she wakes up 2x per night for unknown reasons.  Nocturia: No  Falls back asleep right away  Final wake time:  8-9 AM daily  Out of bed time:  10:30 PM daily  Shift work: No  Naps: once per month for 2 hours usually when patient is having PMS    Feels rested after a nap: No  Average sleep duration (excluding " naps): 8 hours     SLEEP ENVIRONMENT  Sleep location:  bed  Sleep status: sleeps with   Preferred sleep position: side  TV in bedroom: No  Room is dark: Yes  Room is quiet: Yes  Room is cool: Yes    SLEEP HABITS  Smoking: no  ETOH: 1 bottle wine once a month  Marijuana: no  Caffeine: 255 mg for 3 times per week  Sleep aids: on Seroquel    SLEEP ROS  Night symptoms: POSITIVE for nasal congestion , mouth breathing, night sweats during sleep, and waking up with racing heart and NEGATIVE for witnessed apnea, wake up gasping and/or choking for air, heartburn or sour taste in mouth at night, nocturnal cough, and nocturia  Morning symptoms: POSITIVE for unrefreshing sleep, morning dry mouth, and morning sore throat and NEGATIVE for morning headache  Daytime symptoms POSITIVE for excessive daytime sleepiness, fatigue, trouble remembering things in daytime, trouble staying focused in daytime, irritability in daytime, drowsy driving, and history of car accident due to drowsy driving and NEGATIVE for history of near-miss car accident due to drowsy driving  Hypersomnia / narcolepsy symptoms: Patient denies symptoms of a hypersomnolence disorder such as sleep paralysis, sleep-related hallucinations, and cataplexy.   Parasomnia symptoms: POSITIVE for sleep talking, acting out dreams, and nightmares and NEGATIVE for sleep walking, sleep eating, and falling from bed  Movements in sleep: Patient denies problematic movements in sleep such as seizures during sleep, frequent leg kicks / jerks while asleep, sleep-related bruxism, and waking up with bedsheets in disarray.    RLS screen: Patient admits having urge to move legs that occurs at rest (sitting, getting into bed, or lying n bed), worse in the evening, and relieved temporarily with movement.   Frequency of RLS symptoms: 2 times per week  RLS symptoms occurring in daytime: Yes   RLS symptoms progressing to arms: Yes   RLS symptoms affect sleep onset: No   RLS symptoms  wakes patient up from sleep: No   Current or past treatment for RLS: used to be on ferrous sulfate for 3 months and TSAT kept going down; hence, she was switched to heme iron which she started 1 month ago  Precluding events of RLS symptoms: none    WEIGHT: stable    Claustrophobia: Yes. Tried weighted blanket for anxiety but it made panic attack worse.     REVIEW OF SYSTEMS     All other systems have been reviewed and are negative.    ALLERGIES     No Known Allergies    MEDICATIONS     Current Outpatient Medications   Medication Sig Dispense Refill    cholecalciferol, vitamin D3, 325 mcg (13,000 unit) capsule Take by mouth.      drospirenone-ethinyl estradioL (Maria Fernanda, Gianvi) 3-0.02 mg tablet Take 1 tablet by mouth once daily. 90 tablet 1    FLUoxetine (PROzac) 60 mg tablet Take 1 tablet (60 mg) by mouth once daily.      ibuprofen (Motrin) capsule Take 2 capsules (400 mg) by mouth once daily as needed.      iron polysac/iron heme/FA/B12 (IRON PS-IRON HEM POLY-FA-B12 ORAL) Take by mouth.      prazosin (Minipress) 1 mg capsule TAKE 1 TO 2 CAPSULES BY MOUTH EVERY NIGHT AT BEDTIME AS NEEDED      QUEtiapine (SEROquel) 25 mg tablet Take 1-2 tablets (25-50 mg) by mouth as needed at bedtime.      vit B complex no.12/niacin,B3, (VITAMIN B COMPLEX NO.12-NIACIN ORAL) Take by mouth.       No current facility-administered medications for this visit.       PAST HISTORIES     PERTINENT PAST MEDICAL HISTORY: See HPI    PERTINENT PAST SURGICAL HISTORY for Sleep Medicine:  non-contributory    PERTINENT FAMILY HISTORY for Sleep Medicine:  sleep apnea- father and sister    PERTINENT SOCIAL HISTORY:  She  reports that she has never smoked. She has never used smokeless tobacco. She reports current alcohol use. She reports that she does not use drugs. She currently lives with spouse and employed as per [employer] in [facility]. 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 No significant changes unless documented in the pertinent chart section. Updates made when necessary.     PHYSICAL EXAM     VITAL SIGNS: /74   Pulse 80   Resp 16   Ht 1.651 m (5' 5\")   Wt 70.3 kg (155 lb)   SpO2 97%   BMI 25.79 kg/m²     NECK CIRCUMFERENCE: 13.5 inches    ESS: 13  RACHEL: 20  STOPBAN    Physical Exam  Constitutional: Awake, not in distress  Head: normocephalic, atraumatic  Craniofacial: no retrognathia  Sinus: no tenderness to palpation  Nose: + bilateral inferior turbinate hypertrophy, hard to breathe on either nostril (L>R)  Dental: Class 1 bite, + overjet, no crossbite  Palate: Narrow  Oropharynx: Leon tongue position 1, Mallampati 1, lateral wall narrowing grade 3   Tonsils: +1  Uvula: midline on phonation  Tongue: Midline on protrusion, + Tongue scalloping, no macroglossia  Lungs: Clear to auscultation bilateral, no rales  Heart: Regular rate and rhythm, no murmurs  Skin: Warm, no rash  Neuro: No tremors, moves all extremities  Psych: alert and oriented to time, place, and person    RESULTS/DATA     Iron (ug/dL)   Date Value   2024 41   2024 52     % Saturation (%)   Date Value   2024 13 (L)   2024 17 (L)     TIBC (ug/dL)   Date Value   2024 309   2024 311     Ferritin (ng/mL)   Date Value   2024 26       Bicarbonate   Date Value Ref Range Status   2024 27 21 - 32 mmol/L Final     ASSESSMENT/PLAN     Mahi Cuba is a 34 y.o. female who is seen today in [facility] for the following problems:.     EXCESSIVE DAYTIME SLEEPINESS  - probably due to BARBARA, r/o narcolepsy. Differential diagnoses are BARBARA, PLMD, and insufficient sleep syndrome, idiopathic hypersomnia, insomnia (e.g. from poor sleep hygiene, psychophysiologic, etc), mental and affective disorders (e.g. depression, anxiety, etc), or medication-induced.  - In order to better assess the underlying disorder, a baseline " full night polysomnogram (PSG) followed by a multiple sleep latency test (MSLT) have been ordered. The PSG ensures a regular night of sleep prior to the MSLT and rules out other sleep disorders such as BARBARA or periodic limb movement disorder. The MSLT assesses mean time to sleep onset during 4-5 naps as well as for presence of sleep-onset REM periods.   - Urine drug screen was ordered to be collected and performed on the morning of MSLT.  - Some of the medications the patient is taking may affect the results of this study, such as Fluoxetine. Patient has been instructed to discontinue these medications for two weeks prior to the test but need to get approval and instructions from prescribing provider or psychiatrist. However, due to history of suicidal ideations when not taking anti-depressants, advised patient to continue fluoxetine and consider lower the dose of fluoxetine.  - Patient has been advised to maintain regular sleep schedule 2 weeks prior to the study and to avoid sleep deprivation.  Gave a sleep diary to patient to document sleep habits and sleep-wake schedule. Advised patient to bring the sleep diary to sleep lab for review. Further treatment plan will be discussed after reviewing the results of this PSG-MSLT.   - Educated patient about sleep and narcolepsy (pathophysiology and treatment options) / PSG-MSLT procedure / driving precautions  - Advised patient not to drive vehicle or operate heavy machinery when sleepy. A person driving while sleepy is 5 times more likely to have an accident. If you feel sleepy, pull over and take a short power nap (sleep for less than 30 minutes). Otherwise, ask somebody to drive you.  - Patient agreed to the plan and verbalized understanding.    DREAM ENACTMENT BEHAVIOR  - Recommend diagnostic PSG with upper arm EMG leads (RBD protocol).  - Discussed sleep apnea in detail to include pathophysiology, risk factors, diagnostic options, treatment options, and cardiovascular  / neurologic consequences of untreated BARBARA.   - Supportive management as follows: Lose weight. Stay off your back when sleeping. Avoid smoking, alcohol, and sedating medications if applicable. Don't drive when sleepy.    RESTLESS LEG SYNDROME  - Check ferritin and TSAT today.   - Per patient, she used to take Ferrous sulfate for 3 months but TSAT kept going down; hence, she was switched to Iron Heme 1 month ago.   - May continue iron heme for now and repeat ferritin and TSAT in 3 months. If ferritin>100, no need to give any kind of iron supplementation. If Ferritin still less than 75 or 75 to 99, recommend iron infusion at that time.  - Supportive management: Avoid triggers of RLS such as caffeine, alcohol, nicotine, medications, and low iron. Taper off or reduce dose of medications that can cause RLS such as but not limited ot antidepressants except Buproprion and Trazodone, 1st generation antihistamines, antipsychotics, anti-emetics except ondansetron, melatonin, topiramate, etc. Consider switch antidepressants to Bupropion if feasible. May take warm bath 2 hours before bedtime. Massage and/or stretch legs while in bed    SEASONAL ALLERGIES   - Start fluticasone nasal spray 2 sprays per nostril once daily 1 hour before bedtime.  - Start Azelastine nasal spray, 2 sprays per nostril once daily in the morning.   - Alternatively, may add cetirizine or loratadine 10 mg once daily.   - Educated patient on proper use of intranasal sprays.       Follow-up in 2-3 weeks after sleep study.    All of patient's questions were answered. She verbalizes understanding and agreement with my assessment and plan. Refer to after-visit-summary (AVS) for patient education and more details on sleep study preparation, troubleshooting issues with PAP usage, proper cleaning instructions of PAP supplies, and usual recommended replacement schedule for each of the PAP supplies.

## 2024-09-28 ENCOUNTER — LAB (OUTPATIENT)
Dept: LAB | Facility: LAB | Age: 34
End: 2024-09-28
Payer: COMMERCIAL

## 2024-09-28 DIAGNOSIS — D50.8 IRON DEFICIENCY ANEMIA SECONDARY TO INADEQUATE DIETARY IRON INTAKE: ICD-10-CM

## 2024-09-28 DIAGNOSIS — E83.10 DISORDER OF IRON METABOLISM: ICD-10-CM

## 2024-09-28 LAB
FERRITIN SERPL-MCNC: 55 NG/ML (ref 8–150)
IRON SATN MFR SERPL: 17 % (ref 25–45)
IRON SERPL-MCNC: 68 UG/DL (ref 35–150)
TIBC SERPL-MCNC: 406 UG/DL (ref 240–445)
UIBC SERPL-MCNC: 338 UG/DL (ref 110–370)

## 2024-09-28 PROCEDURE — 36415 COLL VENOUS BLD VENIPUNCTURE: CPT

## 2024-09-28 PROCEDURE — 83540 ASSAY OF IRON: CPT

## 2024-09-28 PROCEDURE — 82728 ASSAY OF FERRITIN: CPT

## 2024-09-28 PROCEDURE — 83550 IRON BINDING TEST: CPT

## 2024-10-01 ENCOUNTER — APPOINTMENT (OUTPATIENT)
Dept: ORTHOPEDIC SURGERY | Facility: CLINIC | Age: 34
End: 2024-10-01
Payer: COMMERCIAL

## 2024-10-01 DIAGNOSIS — G56.03 BILATERAL CARPAL TUNNEL SYNDROME: Primary | ICD-10-CM

## 2024-10-01 PROCEDURE — 99203 OFFICE O/P NEW LOW 30 MIN: CPT | Performed by: ORTHOPAEDIC SURGERY

## 2024-10-01 PROCEDURE — 1036F TOBACCO NON-USER: CPT | Performed by: ORTHOPAEDIC SURGERY

## 2024-10-01 RX ORDER — FLUOXETINE 10 MG/1
CAPSULE ORAL
COMMUNITY
Start: 2024-09-04

## 2024-10-01 RX ORDER — FLUOXETINE HYDROCHLORIDE 40 MG/1
1 CAPSULE ORAL
COMMUNITY
Start: 2024-08-13

## 2024-10-01 ASSESSMENT — PAIN - FUNCTIONAL ASSESSMENT: PAIN_FUNCTIONAL_ASSESSMENT: 0-10

## 2024-10-01 ASSESSMENT — PAIN SCALES - GENERAL: PAINLEVEL_OUTOF10: 2

## 2024-10-02 ENCOUNTER — APPOINTMENT (OUTPATIENT)
Dept: PRIMARY CARE | Facility: CLINIC | Age: 34
End: 2024-10-02
Payer: COMMERCIAL

## 2024-10-02 PROBLEM — F32.A DEPRESSION: Status: RESOLVED | Noted: 2017-10-21 | Resolved: 2024-10-02

## 2024-10-02 NOTE — PROGRESS NOTES
History of Present Illness:  Chief Complaint   Patient presents with    Left Hand - Pain, Numbness    Right Hand - Pain, Numbness       The patient presents today for evaluation of bilateral hand pain.  The patient endorses numbness and tingling.  Right side more symptomatic than left.  T The patient has tried to the following interventions thus far:  Bracing, but with persistent symptoms despite some improvements .  The patient notes the pain is worse with elevated hand activities and at night.  The patient denies any recent trauma.  The pain is sharp, electrical in nature.  Symptoms began years ago, but worsening.      Past Medical History:   Diagnosis Date    Alcohol intoxication (CMS-HCC) 02/13/2024    Allergic rhinitis     Anemia     Drug withdrawal syndrome (Multi) 10/21/2017    Myofascial pain 03/27/2017       Medication Documentation Review Audit       Reviewed by Joana Osman CMA (Medical Assistant) on 10/01/24 at 0954      Medication Order Taking? Sig Documenting Provider Last Dose Status   azelastine (Astelin) 137 mcg (0.1 %) nasal spray [id number]  Administer 2 sprays into each nostril once daily. Shake gently then remove cap and point spray tip sideways toward your ears before applying. After use, clean tip. Salima Philip MD  Active   cholecalciferol, vitamin D3, 325 mcg (13,000 unit) capsule [id number]  Take by mouth. Historical Provider, MD  Active   drospirenone-ethinyl estradioL (Maria Fernanda, Gianvi) 3-0.02 mg tablet [id number]  Take 1 tablet by mouth once daily. Otilia Gomez, APRN-CNP  Active   FLUoxetine (PROzac) 60 mg tablet [id number]  Take 1 tablet (60 mg) by mouth once daily. Historical Provider, MD  Active   fluticasone (Flonase) 50 mcg/actuation nasal spray [id number]  Administer 2 sprays per nostril once daily 1 hour before bedtime. Shake gently then remove cap and point spray tip sideways toward your ears before applying. Prime pump before first use. After use, clean tip. Salima Philip MD   Active   ibuprofen (Motrin) capsule [id number]  Take 2 capsules (400 mg) by mouth once daily as needed. Historical Provider, MD  Active   iron polysac/iron heme/FA/B12 (IRON PS-IRON HEM POLY-FA-B12 ORAL) [id number]  Take by mouth. Historical Provider, MD  Active     Discontinued 09/27/24 1614     Discontinued 09/27/24 1613   vit B complex no.12/niacin,B3, (VITAMIN B COMPLEX NO.12-NIACIN ORAL) [id number]  Take by mouth. Historical Provider, MD  Active                    No Known Allergies    Social History     Socioeconomic History    Marital status:      Spouse name: NIKKI OCHOA    Number of children: Not on file    Years of education: Not on file    Highest education level: Not on file   Occupational History    Not on file   Tobacco Use    Smoking status: Never    Smokeless tobacco: Never   Vaping Use    Vaping status: Never Used   Substance and Sexual Activity    Alcohol use: Yes     Comment: SOCIALLY    Drug use: Never    Sexual activity: Yes     Partners: Male     Birth control/protection: Condom Male   Other Topics Concern    Not on file   Social History Narrative    Not on file     Social Determinants of Health     Financial Resource Strain: Not on file   Food Insecurity: Not on file   Transportation Needs: Not on file   Physical Activity: Not on file   Stress: Not on file   Social Connections: Not on file   Intimate Partner Violence: Not on file   Housing Stability: Not on file       Past Surgical History:   Procedure Laterality Date    HERNIA REPAIR  2015    MISENTARY HERNIA    ORTHODONTIC TREATMENT            Review of Systems   GENERAL: Negative for malaise, significant weight loss, fever  MUSCULOSKELETAL: see HPI  NEURO:  see HPI     Physical Examination:  Constitutional: Appears well-developed and well-nourished.  Head: Normocephalic and atraumatic.  Eyes: EOMI grossly  Cardiovascular: Intact distal pulses.   Respiratory: Effort normal. No respiratory distress.  Neurologic: Alert and oriented to person,  place, and time.  Skin: Skin is warm and dry.  Hematologic / Lymphatic: No lymphedema, lymphangitis.  Psychiatric: normal mood and affect. Behavior is normal.   Musculoskeletal:  bilateral wrist/hand:  Healing skin abrasions  caused by handling of iguana  No obvious swelling or masses  No thenar atrophy  No atrophy noted of hypothenar eminence   Positive Tinel's at carpal tunnel  + Median nerve compression test  + Beverly's test  Sensation grossly intact throughout  5/5 thumb Abduction, 5/5 Finger Abduction  Radial pulse palpable  Good capillary refill     Assessment:  Patient with bilateral carpal tunnel syndrome     Plan:  We reviewed the disease process with the patient.  We discussed the role for electrodiagnostic testing and treatment decision making, immobilization, and injections.  We discussed surgical intervention reviewing the risks (neurologic injury, wound issues including infection, pillar pain, and recurrence) and benefits.  EMG has been ordered for further review.  She will continue with overnight bracing.  Plan on in person versus virtual follow-up to review EMG findings.

## 2024-10-03 ENCOUNTER — TELEPHONE (OUTPATIENT)
Dept: SLEEP MEDICINE | Facility: CLINIC | Age: 34
End: 2024-10-03
Payer: COMMERCIAL

## 2024-10-03 DIAGNOSIS — G25.81 RLS (RESTLESS LEGS SYNDROME): Primary | ICD-10-CM

## 2024-10-03 DIAGNOSIS — E83.10 DISORDER OF IRON METABOLISM: ICD-10-CM

## 2024-10-03 RX ORDER — FERROUS SULFATE 325(65) MG
65 TABLET ORAL
Qty: 180 TABLET | Refills: 0 | Status: SHIPPED | OUTPATIENT
Start: 2024-10-03 | End: 2024-10-03 | Stop reason: ENTERED-IN-ERROR

## 2024-10-03 NOTE — TELEPHONE ENCOUNTER
Called patient and left VM regarding lab results.  Sleep nurse phone number [phone] - given to call back to discuss results and plan going forward.

## 2024-10-03 NOTE — PATIENT INSTRUCTIONS
"Thank you for coming to the Sleep Medicine Clinic today! Your sleep medicine provider today was: Salima Phliip MD Below is a summary of your treatment plan, patient education, other important information, and our contact numbers.      TREATMENT PLAN     - Do the following testing: diagnostic in-lab sleep study.  - Start Fluticasone nasal spray and apply 2 sprays per nostril once daily 1 hour before bedtime. Make sure to point the spray tip sideways toward your ears. Then remove spray and pinch the nose for 10 seconds. If Fluticasone nasal spray did not work, may add azelastine nasal spray and apply 2 sprays per nostril once daily in the morning. May also try saline rinse (i.e. Netipot) prior the above nasal sprays and/or take cetirizine or loratadine 10 mg once daily.  - Please read the \"Patient Education\" section below for more detailed information. Try implementing tips, reminders, strategies, and supportive management.   - If not yet done, please sign up for [employer] to make a future schedule, send prescription requests, or send messages.    Follow-up Appointment:   Follow-up in 2-3 weeks after sleep study.    PATIENT EDUCATION     OBSTRUCTIVE SLEEP APNEA (BARBARA) is a sleep disorder where your upper airway muscles relax during sleep and the airway intermittently and repetitively narrows and collapses leading to partially blocked airway (hypopnea) or completely blocked airway (apnea) which, in turn, can disrupt breathing in sleep, lower oxygen levels while you sleep and cause night time wakings. Because both apnea and hypopnea may cause higher carbon dioxide or low oxygen levels, untreated BARBARA can lead to heart arrhythmia, elevation of blood pressure, and make it harder for the body to consolidate memory and facilitate metabolism (leading to higher blood sugars at night). Frequent partial arousals occur during sleep resulting in sleep deprivation and daytime sleepiness. BARBARA is associated with an increased risk of " cardiovascular disease, stroke, hypertension, and insulin resistance. Moreover, untreated BARBARA with excessive daytime sleepiness can increase the risk of motor vehicular accidents.    Below are conservative strategies for BARBARA regardless of BARBARA severity are:   Positional therapy - Avoid sleeping on your back.   Healthy diet and regular exercise to optimize weight is highly encouraged.   Avoid alcohol late in the evening and sedative-hypnotics as these substances can make sleep apnea worse.   Improve breathing through the nose with intranasal steroid spray, saline rinse, or antihistamines    Safety: Avoid driving vehicle and operating heavy equipment while sleepy. Drowsy driving may lead to life-threatening motor vehicle accidents. A person driving while sleepy is 5 times more likely to have an accident. If you feel sleepy, pull over and take a short power nap (sleep for less than 30 minutes). Otherwise, ask somebody to drive you.    Treatment options for sleep apnea include weight management, positional therapy, Positive Airway Therapy (PAP) therapy, oral appliance therapy, hypoglossal nerve stimulator (Inspire) and select airway surgeries.    Sleep Testing for sleep apnea: The best and ideal way to check out if you have sleep apnea is to do an overnight sleep study in the sleep laboratory. Alternatively, a home sleep apnea test can also be done depending on your insurance and risk factors.     If you are having a home sleep apnea test, kindly allot 1 hour during pickup of the testing kit as you will have to complete paperwork and listen to the sleep technician for in-person on-the-spot demonstration and instructions on how to hook up the testing kit at home. Do the test for 1 day and start off with sleeping on your back. If you sleep on your side in the middle of night or you have always been a side or stomach-sleeper, it is ok as long as you have some time on your back and off-back.     If you are having an  overnight in-lab sleep study, please make sure to bring toiletries, a comfy pillow, additional warm blankets, and any nighttime medications (include as-needed inhaler, pain pill, etc) that you may regularly take. Also, be sure to eat dinner before you arrive as we generally do not provide meals inside the sleep testing center. Lastly, in order to fall asleep faster in the sleep testing center, we advise patients to wake up 2 hours earlier on the morning of scheduled testing and avoid napping 2 days prior testing. Sometimes, your sleep provider may prescribe a sleep aid to be taken at lights out in the sleep testing center. If you are taking a sleep aid, consider having somebody pick you up after the sleep testing.    Overnight sleep studies may be scheduled on a weekday or weekend. We also perform daytime testing for shift workers on a case-by-case basis.    Once you have booked an appointment to do the sleep study, please contact my office for follow-up visit to discuss results.    On the other hand, if you have any of the following, please consider calling the sleep testing center to RESCHEDULE your sleep study appointment:  If you tested positive for COVID within 10 days of your sleep study appointment.  If you were exposed to somebody who was confirmed for COVID within 10 days of your sleep study appointment and now you are having symptoms of possible COVID  If you have fever>100F or any acute symptoms that you think will lead to poor sleep during testing (e.g. new or worsening stuffy nose not relieved by steroid nasal spray)  If you have traveled domestically or internationally in the last month and now you are having symptoms of possible COVID    How to use nasal sprays properly: If you have nasal congestion or allergies, improving your breathing through the nose is critical for treating sleep apnea and improving your sleep. Hence, intranasal steroid spray like Flonase or Nasocort (generic name is Fluticasone)  might help. In some patients with sleep apnea, using intranasal steroid spray allowed them to tolerate CPAP mask better.     Intranasal steroids are usually prescribed as 2 sprays per nostril 1 hour before bedtime. If you administer intranasal steroids too close to bedtime, it might not work as well.  If you have nasal congestion or allergies during day despite using Flonase at night, try adding Azelastine nasal spray 2 sprays per nostril in the morning. Alternatively, can consider adding over-the-counter non-sedating antihistamine medications.     However, if you have severe nasal congestion or allergies despite using both nasal sprays, consider seeing an allergy specialist to confirm which substance you are sensitive to and get definitive treatment which may be in the form of injections, oral pills, different kind of nose sprays, and/or a combination of everything said.     Below are instructions on how to use intranasal steroid spray properly:  Sit up or stand up with your face down.  Shake the spray bottle and insert its tip in one nostril.  Point the spray tip towards your ear, and not towards the middle of your nose. Pointing the tip upward and in the middle of the nose can lead to discomfort and irritation of nasal mucosa which can lead to nose bleeding or coughing up tinges of blood.  Squeeze the spray bottle and administer the recommended amount of spray.   Don't sniff when you spray. Instead, remove the spray bottle and pinch your nose for 10 seconds.   Perform steps 1-6 on the other nostril.    You can also go to the following EDUCATION WEBSITES for further information:   American Academy of Sleep Medicine http://sleepeducation.org  National Sleep Foundation: https://sleepfoundation.org  American Sleep Apnea Association: https://www.sleepapnea.org (for patients with sleep apnea)  Narcolepsy Network: https://www.narcolepsynetwork.org (for patients with narcolepsy)  [employer]:  https://www.wakeupnarcolepsy.org (for patients with narcolepsy)  Hypersomnia Foundation: https://www.hypersomniafoundation.org (for patients with idiopathic hypersomnia)  RLS foundation: https://www.rls.org (for patients with restless leg syndrome)    IMPORTANT INFORMATION     Call 911 for medical emergencies.  Our offices are generally open from Monday-Friday, 8 am - 5 pm.   There are no supporting services by either the sleep doctors or their staff on weekends and Holidays, or after 5 PM on weekdays.   If you need to get in touch with me, you may either call my office number or you can use [employer].  If a referral for a test, for CPAP, or for another specialist was made, and you have not heard about scheduling this within a week, please call scheduling at [phone]).  If you are unable to make your appointment for clinic or an overnight study, kindly call the office or sleep testing center at least 48 hours in advance to cancel and reschedule.  If you are on CPAP, please bring your device's card and/or the device to each clinic appointment.   In case of problems with PAP machine or mask interface, please contact your [employer] (Durable Medical Equipment) company first. [employer] is the company who provides you the machine and/or PAP supplies.       PRESCRIPTIONS     We require 7 days advanced notice for prescription refills. If we do not receive the request in this time, we cannot guarantee that your medication will be refilled in time.    IMPORTANT PHONE NUMBERS     Sleep Medicine Clinic Fax: [phone]  Appointments (for Pediatric Sleep Clinic): [phone] - option 1  Appointments (for Adult Sleep Clinic): [phone] - option 2  Appointments (For Sleep Studies): [phone] - option 3  Behavioral Sleep Medicine: [phone]  Sleep Surgery: [phone]  Nutrition Service: [phone]  Weight management clinics with endocrinology: [phone]  Bariatric Services: [phone] (includes  weight loss medications and weight loss surgery)  [facility]: [phone] (offers holistic approaches to weight management)  ENT (Otolaryngology): [phone]  Headache Clinic (Neurology): [phone]  Neurology: [phone]  Psychiatry: [phone]  Pulmonary Function Testing (PFT) Center: [phone]  Pulmonary Medicine: [phone]  [employer] Company (DME): [phone]      OUR SLEEP TESTING LOCATIONS     Our team will contact you to schedule your sleep study, however, you can contact us as follow:  Main Phone Line (scheduling only): [phone]), option 3  Adult and Pediatric Locations  [facility] (6 years and older): [facility] - 4th floor ([address] [facility]) After hours line: [phone]  [facility] (Main campus: All ages): [facility], 6th floor. After hours line: [phone]   [place] (5 years and older; younger considered on case-by-case basis): [address]; [address], [address]. Scheduling  After hours line: [phone]   [place] (6 years and older): [address]; [address]; [address]   [place] (6 years and older): [address] [facility], [address]  After hours line: [phone]   [place] (13 years and older) in [place]: [address], 2nd floor  After hours line: [phone]   [place] (13 year and older): [address]  After hours line: [phone]     Adult Only Locations:   [place] (18 years and older): [address] [facility], 2nd floor   [place] (18 years and older): [address] [facility]; 4th floor  After hours line: [phone]   [place] (18 years and older) at [place]: [address] [place] [address]  After hours line: [phone]     CONTACTING YOUR SLEEP MEDICINE PROVIDER AND SLEEP TEAM      For issues with your machine or mask interface, please call your DME provider first. DME stands for durable medical company. DME  "is the company who provides you the machine and/or PAP supplies / accessories.   To schedule, cancel, or reschedule SLEEP STUDY APPOINTMENTS, please call the Main Phone Line at [phone] - option 3.   To schedule, cancel, or reschedule CLINIC APPOINTMENTS, you can do it in \"[employer]\", call [phone] (direct line) to speak with my practice lead (Pat Parsons), or call the Main Phone Line at [phone] - option 2  For CLINICAL QUESTIONS or MEDICATION REFILLS, please call direct line for Adult Sleep Nurses at [phone].   Lastly, you can also send a message directly to your provider through \"My Chart\", which is the email service through your  Records Account: [email or web address]       Here at [facility], we wish you a restful sleep!    "

## 2024-10-03 NOTE — TELEPHONE ENCOUNTER
----- Message from Nurse Sarah VINES sent at 10/3/2024  8:36 AM EDT -----    ----- Message -----  From: Salima Philip MD  Sent: 10/3/2024   8:35 AM EDT  To: Sarah Wylie RN    Iron is low. Start iron pills 2x daily with 1 glass of orange juice or vitamin C pill on empty stomach. Repeat blood test in 3 months. Rx for iron pills sent to pharmacy. Side effects include stomach upset, nausea, vomiting, constipation, and black stools. Iron pills may help with RLS symptoms. If iron pills not tolerated, let me know and we can switch to iron infusion 2-3 times in 2 weeks.

## 2024-10-07 ENCOUNTER — APPOINTMENT (OUTPATIENT)
Dept: PRIMARY CARE | Facility: CLINIC | Age: 34
End: 2024-10-07
Payer: COMMERCIAL

## 2024-10-07 VITALS
DIASTOLIC BLOOD PRESSURE: 74 MMHG | OXYGEN SATURATION: 97 % | SYSTOLIC BLOOD PRESSURE: 105 MMHG | HEIGHT: 65 IN | BODY MASS INDEX: 25.66 KG/M2 | WEIGHT: 154 LBS | HEART RATE: 80 BPM

## 2024-10-07 DIAGNOSIS — F41.1 GAD (GENERALIZED ANXIETY DISORDER): ICD-10-CM

## 2024-10-07 DIAGNOSIS — D50.9 IRON DEFICIENCY ANEMIA, UNSPECIFIED IRON DEFICIENCY ANEMIA TYPE: Primary | ICD-10-CM

## 2024-10-07 PROCEDURE — 99213 OFFICE O/P EST LOW 20 MIN: CPT | Performed by: NURSE PRACTITIONER

## 2024-10-07 PROCEDURE — 3008F BODY MASS INDEX DOCD: CPT | Performed by: NURSE PRACTITIONER

## 2024-10-07 PROCEDURE — 1036F TOBACCO NON-USER: CPT | Performed by: NURSE PRACTITIONER

## 2024-10-07 NOTE — PROGRESS NOTES
"Subjective   Patient ID: Mahi Cuba is a 34 y.o. female who presents for Follow-up (Patient is here today for a follow up on her lab results. ).    34 year old female here for follow up iron deficiency.   Started on the oral iron supplement  Sleep study at the end of the month. Still has bad dreams.   Ferritin is up but remains <100. She was not on the iron supplement for a month. Orders in the chart to have repeated by sleep med.   Started on oral BCP- she thinks this is helping with the increased energy, insomnia. Her mood is getting better.   Will be seeing ortho for poss carpal tunnel bilaterally. RT>LT. Getting an EMG  Weaning the seroquel off  The prazosin is done and stopped- was not helping with insomnia and fear   Remains on the fluoxetine but weaning it down       Review of Systems    Objective   /74   Pulse 80   Ht 1.651 m (5' 5\")   Wt 69.9 kg (154 lb)   SpO2 97%   BMI 25.63 kg/m²     Physical Exam  Eyes:      Extraocular Movements: Extraocular movements intact.      Pupils: Pupils are equal, round, and reactive to light.   Cardiovascular:      Rate and Rhythm: Normal rate and regular rhythm.      Pulses: Normal pulses.   Pulmonary:      Effort: Pulmonary effort is normal.   Neurological:      General: No focal deficit present.      Mental Status: She is alert and oriented to person, place, and time. Mental status is at baseline.   Psychiatric:         Mood and Affect: Mood normal.         Behavior: Behavior normal.       Assessment/Plan   Diagnoses and all orders for this visit:  Iron deficiency anemia, unspecified iron deficiency anemia type  Comments:  seeing sleep med. they have placed an order for future labs with a plan for iron replacement.  TENA (generalized anxiety disorder)  Other orders  -     Follow Up In Primary Care - Established  -     Follow Up In Primary Care - Established; Future         "

## 2024-10-09 NOTE — TELEPHONE ENCOUNTER
Called patient and spoke with her regarding her low lab work. Patient aware that her iron levels are low, patient states she is currently taking Ferrous Sulfate orally and will continue. Patient made aware that Dr. Philip would like her to repeat her iron and ferritin labs in 3 months and that she has placed orders for the future. Patient verbalized understanding.

## 2024-10-17 ENCOUNTER — HOSPITAL ENCOUNTER (OUTPATIENT)
Facility: CLINIC | Age: 34
Discharge: HOME | End: 2024-10-17
Payer: COMMERCIAL

## 2024-10-17 ENCOUNTER — TELEPHONE (OUTPATIENT)
Dept: SLEEP MEDICINE | Facility: CLINIC | Age: 34
End: 2024-10-17

## 2024-10-17 DIAGNOSIS — G56.03 BILATERAL CARPAL TUNNEL SYNDROME: ICD-10-CM

## 2024-10-17 PROCEDURE — 95886 MUSC TEST DONE W/N TEST COMP: CPT | Performed by: STUDENT IN AN ORGANIZED HEALTH CARE EDUCATION/TRAINING PROGRAM

## 2024-10-17 PROCEDURE — 95913 NRV CNDJ TEST 13/> STUDIES: CPT | Performed by: STUDENT IN AN ORGANIZED HEALTH CARE EDUCATION/TRAINING PROGRAM

## 2024-10-17 NOTE — TELEPHONE ENCOUNTER
Patient called and said that you both had gone over the options for sleep studies. Patient said originally you had ordered a 2 part sleep study but the patient had called to schedule and all of the locations are telling her that either they do not do those tests or that they can not get her scheduled until the new year.    Patient is wondering if instead of doing the 2 part sleep studies if she can do just the 1?    Patient wanted to know if this would be okay and if you can call her [phone]

## 2024-10-24 ENCOUNTER — APPOINTMENT (OUTPATIENT)
Dept: ALLERGY | Facility: CLINIC | Age: 34
End: 2024-10-24
Payer: COMMERCIAL

## 2024-10-24 ENCOUNTER — APPOINTMENT (OUTPATIENT)
Dept: SLEEP MEDICINE | Facility: CLINIC | Age: 34
End: 2024-10-24
Payer: COMMERCIAL

## 2024-10-28 ENCOUNTER — TELEPHONE (OUTPATIENT)
Dept: SLEEP MEDICINE | Facility: CLINIC | Age: 34
End: 2024-10-28
Payer: COMMERCIAL

## 2024-10-29 ENCOUNTER — TELEPHONE (OUTPATIENT)
Dept: SLEEP MEDICINE | Facility: CLINIC | Age: 34
End: 2024-10-29
Payer: COMMERCIAL

## 2024-10-30 ENCOUNTER — APPOINTMENT (OUTPATIENT)
Dept: ALLERGY | Facility: CLINIC | Age: 34
End: 2024-10-30
Payer: COMMERCIAL

## 2024-10-31 ENCOUNTER — APPOINTMENT (OUTPATIENT)
Dept: SLEEP MEDICINE | Facility: CLINIC | Age: 34
End: 2024-10-31
Payer: COMMERCIAL

## 2024-11-04 ENCOUNTER — APPOINTMENT (OUTPATIENT)
Dept: ORTHOPEDIC SURGERY | Facility: CLINIC | Age: 34
End: 2024-11-04
Payer: COMMERCIAL

## 2024-11-04 DIAGNOSIS — G56.03 BILATERAL CARPAL TUNNEL SYNDROME: Primary | ICD-10-CM

## 2024-11-04 PROCEDURE — 20526 THER INJECTION CARP TUNNEL: CPT | Performed by: ORTHOPAEDIC SURGERY

## 2024-11-04 PROCEDURE — 1036F TOBACCO NON-USER: CPT | Performed by: ORTHOPAEDIC SURGERY

## 2024-11-04 PROCEDURE — 99214 OFFICE O/P EST MOD 30 MIN: CPT | Performed by: ORTHOPAEDIC SURGERY

## 2024-11-04 RX ORDER — TRIAMCINOLONE ACETONIDE 40 MG/ML
40 INJECTION, SUSPENSION INTRA-ARTICULAR; INTRAMUSCULAR
Status: COMPLETED | OUTPATIENT
Start: 2024-11-04 | End: 2024-11-04

## 2024-11-04 RX ORDER — LIDOCAINE HYDROCHLORIDE 10 MG/ML
1 INJECTION, SOLUTION INFILTRATION; PERINEURAL
Status: COMPLETED | OUTPATIENT
Start: 2024-11-04 | End: 2024-11-04

## 2024-11-04 ASSESSMENT — PAIN - FUNCTIONAL ASSESSMENT: PAIN_FUNCTIONAL_ASSESSMENT: 0-10

## 2024-11-04 ASSESSMENT — PAIN SCALES - GENERAL: PAINLEVEL_OUTOF10: 3

## 2024-11-05 NOTE — PROGRESS NOTES
History of Present Illness:  Chief Complaint   Patient presents with    Right Wrist - Follow-up     CTS    Left Wrist - Follow-up     CTS       Patient presents for repeat evaluation of bilateral hand numbness and tingling most consistent with carpal tunnel syndrome.  She recently completed EMG that did not demonstrate any changes consistent with carpal tunnel syndrome.  She continues to have intermittent numbness and tingling that is worse with elevated hand activities and overnight.  Right side more symptomatic than left.    Past Medical History:   Diagnosis Date    Alcohol intoxication (CMS-HCC) 02/13/2024    Allergic rhinitis     Anemia     Drug withdrawal syndrome (Multi) 10/21/2017    Myofascial pain 03/27/2017       Medication Documentation Review Audit       Reviewed by Lizet Gavin MA (Medical Assistant) on 11/04/24 at 1337      Medication Order Taking? Sig Documenting Provider Last Dose Status   azelastine (Astelin) 137 mcg (0.1 %) nasal spray [id number]  Administer 2 sprays into each nostril once daily. Shake gently then remove cap and point spray tip sideways toward your ears before applying. After use, clean tip. Salima Philip MD  Active   cholecalciferol, vitamin D3, 325 mcg (13,000 unit) capsule [id number] No Take by mouth. Historical Provider, MD Taking Active   drospirenone-ethinyl estradioL (Maria Fernanda, Gianvi) 3-0.02 mg tablet [id number] No Take 1 tablet by mouth once daily. Otilia Gomez, APRN-CNP Taking Active   FLUoxetine (PROzac) 10 mg capsule [id number]  TAKE 1 CAPSULE BY MOUTH DAILY ALONG WITH 40 MG TO EQUAL 50 MG Historical Provider, MD  Active   FLUoxetine (PROzac) 40 mg capsule [id number]  Take 1 capsule (40 mg) by mouth early in the morning.. Historical Provider, MD  Active   fluticasone (Flonase) 50 mcg/actuation nasal spray [id number]  Administer 2 sprays per nostril once daily 1 hour before bedtime. Shake gently then remove cap and point spray tip sideways toward your ears before  applying. Prime pump before first use. After use, clean tip. Salima Philip MD  Active   ibuprofen (Motrin) capsule [id number] No Take 2 capsules (400 mg) by mouth once daily as needed. Historical Provider, MD Taking Active   iron polysac/iron heme/FA/B12 (IRON PS-IRON HEM POLY-FA-B12 ORAL) [id number] No Take by mouth. Historical Provider, MD Taking Active   vit B complex no.12/niacin,B3, (VITAMIN B COMPLEX NO.12-NIACIN ORAL) [id number] No Take by mouth. Historical Provider, MD Taking Active                    No Known Allergies    Social History     Socioeconomic History    Marital status:      Spouse name: NIKKI OCHOA    Number of children: Not on file    Years of education: Not on file    Highest education level: Not on file   Occupational History    Not on file   Tobacco Use    Smoking status: Never    Smokeless tobacco: Never   Vaping Use    Vaping status: Never Used   Substance and Sexual Activity    Alcohol use: Yes     Comment: SOCIALLY    Drug use: Never    Sexual activity: Yes     Partners: Male     Birth control/protection: Condom Male   Other Topics Concern    Not on file   Social History Narrative    Not on file     Social Drivers of Health     Financial Resource Strain: Not on file   Food Insecurity: Not on file   Transportation Needs: Not on file   Physical Activity: Not on file   Stress: Not on file   Social Connections: Not on file   Intimate Partner Violence: Not on file   Housing Stability: Not on file       Past Surgical History:   Procedure Laterality Date    HERNIA REPAIR  2015    MISENTARY HERNIA    ORTHODONTIC TREATMENT            Review of Systems   GENERAL: Negative for malaise, significant weight loss, fever  MUSCULOSKELETAL: see HPI  NEURO:  see HPI     Physical Examination:  Constitutional: Appears well-developed and well-nourished.  Head: Normocephalic and atraumatic.  Eyes: EOMI grossly  Cardiovascular: Intact distal pulses.   Respiratory: Effort normal. No respiratory  distress.  Neurologic: Alert and oriented to person, place, and time.  Skin: Skin is warm and dry.  Hematologic / Lymphatic: No lymphedema, lymphangitis.  Psychiatric: normal mood and affect. Behavior is normal.   Musculoskeletal:  bilateral wrist/hand:  Previous abrasions have healed  No obvious swelling or masses  No thenar atrophy  No atrophy noted of hypothenar eminence   Positive Tinel's at carpal tunnel  + Median nerve compression test  + Beverly's test  Sensation grossly intact throughout  5/5 thumb Abduction, 5/5 Finger Abduction  Radial pulse palpable  Good capillary refill     Assessment:  Patient with bilateral carpal tunnel symptoms but with negative EMG     Plan:  Given negative EMG we discussed risks and benefits of various treatment options.  After thoroughly reviewing she would like to proceed with diagnostic/potentially therapeutic corticosteroid injection.  She will carefully monitor her symptoms following injection and if good relief she would potentially be candidate for carpal tunnel release surgery.    If no response to corticosteroid injection may consider neuromuscular ultrasound testing.    Patient ID: Mahi Cuba is a 34 y.o. female.    Hand / UE Inj/Asp: R carpal tunnel for carpal tunnel syndrome on 11/4/2024 11:33 PM  Indications: pain  Details: 25 G needle, volar approach  Medications: 40 mg triamcinolone acetonide 40 mg/mL; 1 mL lidocaine 10 mg/mL (1 %)  Outcome: tolerated well, no immediate complications  Procedure, treatment alternatives, risks and benefits explained, specific risks discussed. Consent was given by the patient. Immediately prior to procedure a time out was called to verify the correct patient, procedure, equipment, support staff and site/side marked as required. Patient was prepped and draped in the usual sterile fashion.

## 2024-11-09 DIAGNOSIS — B99.9 RECURRENT INFECTIONS: Primary | ICD-10-CM

## 2024-11-20 ENCOUNTER — LAB (OUTPATIENT)
Dept: LAB | Facility: LAB | Age: 34
End: 2024-11-20
Payer: COMMERCIAL

## 2024-11-20 ENCOUNTER — APPOINTMENT (OUTPATIENT)
Dept: PRIMARY CARE | Facility: CLINIC | Age: 34
End: 2024-11-20
Payer: COMMERCIAL

## 2024-11-20 ENCOUNTER — APPOINTMENT (OUTPATIENT)
Dept: SLEEP MEDICINE | Facility: CLINIC | Age: 34
End: 2024-11-20
Payer: COMMERCIAL

## 2024-11-20 VITALS
SYSTOLIC BLOOD PRESSURE: 119 MMHG | WEIGHT: 153 LBS | HEART RATE: 113 BPM | DIASTOLIC BLOOD PRESSURE: 88 MMHG | HEIGHT: 65 IN | OXYGEN SATURATION: 98 % | BODY MASS INDEX: 25.49 KG/M2

## 2024-11-20 DIAGNOSIS — R07.89 CHEST DISCOMFORT: Primary | ICD-10-CM

## 2024-11-20 DIAGNOSIS — M79.7 FIBROMYALGIA: ICD-10-CM

## 2024-11-20 DIAGNOSIS — B99.9 RECURRENT INFECTIONS: ICD-10-CM

## 2024-11-20 DIAGNOSIS — F41.1 GAD (GENERALIZED ANXIETY DISORDER): ICD-10-CM

## 2024-11-20 DIAGNOSIS — R07.89 CHEST DISCOMFORT: ICD-10-CM

## 2024-11-20 LAB
ALBUMIN SERPL BCP-MCNC: 4 G/DL (ref 3.4–5)
ALP SERPL-CCNC: 47 U/L (ref 33–110)
ALT SERPL W P-5'-P-CCNC: 15 U/L (ref 7–45)
ANION GAP SERPL CALC-SCNC: 11 MMOL/L (ref 10–20)
AST SERPL W P-5'-P-CCNC: 15 U/L (ref 9–39)
BASOPHILS # BLD AUTO: 0.04 X10*3/UL (ref 0–0.1)
BASOPHILS NFR BLD AUTO: 0.4 %
BILIRUB SERPL-MCNC: 1 MG/DL (ref 0–1.2)
BUN SERPL-MCNC: 11 MG/DL (ref 6–23)
CALCIUM SERPL-MCNC: 8.9 MG/DL (ref 8.6–10.3)
CHLORIDE SERPL-SCNC: 106 MMOL/L (ref 98–107)
CO2 SERPL-SCNC: 24 MMOL/L (ref 21–32)
CREAT SERPL-MCNC: 0.81 MG/DL (ref 0.5–1.05)
D DIMER PPP FEU-MCNC: 218 NG/ML FEU
EGFRCR SERPLBLD CKD-EPI 2021: >90 ML/MIN/1.73M*2
EOSINOPHIL # BLD AUTO: 0.15 X10*3/UL (ref 0–0.7)
EOSINOPHIL NFR BLD AUTO: 1.6 %
ERYTHROCYTE [DISTWIDTH] IN BLOOD BY AUTOMATED COUNT: 12 % (ref 11.5–14.5)
GLUCOSE SERPL-MCNC: 87 MG/DL (ref 74–99)
HCT VFR BLD AUTO: 45.2 % (ref 36–46)
HGB BLD-MCNC: 15.2 G/DL (ref 12–16)
IGA SERPL-MCNC: 269 MG/DL (ref 70–400)
IGG SERPL-MCNC: 1320 MG/DL (ref 700–1600)
IGM SERPL-MCNC: 139 MG/DL (ref 40–230)
IMM GRANULOCYTES # BLD AUTO: 0.03 X10*3/UL (ref 0–0.7)
IMM GRANULOCYTES NFR BLD AUTO: 0.3 % (ref 0–0.9)
LYMPHOCYTES # BLD AUTO: 2.04 X10*3/UL (ref 1.2–4.8)
LYMPHOCYTES NFR BLD AUTO: 21.6 %
MAGNESIUM SERPL-MCNC: 2.02 MG/DL (ref 1.6–2.4)
MCH RBC QN AUTO: 31.5 PG (ref 26–34)
MCHC RBC AUTO-ENTMCNC: 33.6 G/DL (ref 32–36)
MCV RBC AUTO: 94 FL (ref 80–100)
MONOCYTES # BLD AUTO: 0.73 X10*3/UL (ref 0.1–1)
MONOCYTES NFR BLD AUTO: 7.7 %
NEUTROPHILS # BLD AUTO: 6.44 X10*3/UL (ref 1.2–7.7)
NEUTROPHILS NFR BLD AUTO: 68.4 %
NRBC BLD-RTO: 0 /100 WBCS (ref 0–0)
PLATELET # BLD AUTO: 310 X10*3/UL (ref 150–450)
POTASSIUM SERPL-SCNC: 4.3 MMOL/L (ref 3.5–5.3)
PROT SERPL-MCNC: 7.1 G/DL (ref 6.4–8.2)
RBC # BLD AUTO: 4.83 X10*6/UL (ref 4–5.2)
SODIUM SERPL-SCNC: 137 MMOL/L (ref 136–145)
TSH SERPL-ACNC: 1.66 MIU/L (ref 0.44–3.98)
WBC # BLD AUTO: 9.4 X10*3/UL (ref 4.4–11.3)

## 2024-11-20 PROCEDURE — 84443 ASSAY THYROID STIM HORMONE: CPT

## 2024-11-20 PROCEDURE — 3008F BODY MASS INDEX DOCD: CPT | Performed by: NURSE PRACTITIONER

## 2024-11-20 PROCEDURE — 80053 COMPREHEN METABOLIC PANEL: CPT

## 2024-11-20 PROCEDURE — 83735 ASSAY OF MAGNESIUM: CPT

## 2024-11-20 PROCEDURE — 1036F TOBACCO NON-USER: CPT | Performed by: NURSE PRACTITIONER

## 2024-11-20 PROCEDURE — 99214 OFFICE O/P EST MOD 30 MIN: CPT | Performed by: NURSE PRACTITIONER

## 2024-11-20 PROCEDURE — 82784 ASSAY IGA/IGD/IGG/IGM EACH: CPT

## 2024-11-20 PROCEDURE — 85379 FIBRIN DEGRADATION QUANT: CPT

## 2024-11-20 PROCEDURE — 93000 ELECTROCARDIOGRAM COMPLETE: CPT | Performed by: NURSE PRACTITIONER

## 2024-11-20 PROCEDURE — 85025 COMPLETE CBC W/AUTO DIFF WBC: CPT

## 2024-11-20 NOTE — PROGRESS NOTES
"Subjective   Patient ID: Mahi Cuba is a 34 y.o. female who presents for Chest Pain (Patient states she is here today for chest pain. Patient did not go to the ER as recommended due to her OB. Patient has stopped her birth control and states she is feeling better. ).    34 year old female here for follow up CP.   States she was started on BCP then soon developed CP. This has been going on for approx 3 months. She thought it was related to stress. Then it was not going away. She was relating to the BCP. She called GYN and they wanted her in the ED. She did not go to the ED  Non smoker.   Was started on BCP due to her periods causing symptoms monthly- fatigue and changes in mood the week before each period. She was hopeful the BCP would help. She developed insomnia after she started the BCP.   The CP was always a dull pressure. Would come and go. It was located all across the chest.  Worse when laying down like pressure on her chest or something squeezing. No sharp pain. No pain on deep breath.   EKG in office this am was NML.   She stopped the BCP 8 days a go and feels better but she thinks she is starting to get a \"Sinus infection\".  She took Plan B 4 days ago and the chest discomfort came back.   Eating and drinking ok. No pain with eating.   Tried pepcid which did nothing to alleviate the S&S.   She eats a lot of soup and rice.   Chronic anxiety on fluoxetine 50 mg po every day. Off prazosin and seroquel. Not sleeping so was told to go back on the meds. Followed by psych and sleep med.   Sinus pressure LT side. Chronic allergies- saw her allergist last week. Wants to \"head it off\" s&s started yesterday. Fatigue. No sore throat. Afebrile.     Chest Pain          Review of Systems   Cardiovascular:  Positive for chest pain.       Objective   /88   Pulse (!) 113   Ht 1.651 m (5' 5\")   Wt 69.4 kg (153 lb)   SpO2 98%   BMI 25.46 kg/m²     Physical Exam  Constitutional:       Appearance: Normal " appearance.   HENT:      Head: Normocephalic and atraumatic.      Right Ear: Tympanic membrane normal.      Left Ear: Tympanic membrane normal.      Mouth/Throat:      Mouth: Mucous membranes are moist.      Pharynx: Posterior oropharyngeal erythema present.   Cardiovascular:      Rate and Rhythm: Normal rate and regular rhythm.      Pulses: Normal pulses.      Heart sounds: Normal heart sounds.   Pulmonary:      Effort: Pulmonary effort is normal.      Breath sounds: Normal breath sounds.   Skin:     General: Skin is warm.   Neurological:      General: No focal deficit present.      Mental Status: She is alert and oriented to person, place, and time.   Psychiatric:         Mood and Affect: Mood normal.         Behavior: Behavior normal.         Assessment/Plan   Diagnoses and all orders for this visit:  Chest discomfort  Comments:  Her symptoms are a little unclear.  Does not sound like cardiac or pulmonary in nature but we will do blood work to make sure  Orders:  -     Magnesium; Future  -     CBC and Auto Differential; Future  -     Comprehensive Metabolic Panel; Future  -     TSH with reflex to Free T4 if abnormal; Future  -     D-dimer, quantitative; Future  -     ECG 12 lead (Clinic Performed)  Fibromyalgia  Comments:  This can cause musculoskeletal discomfort including neuromuscular pain.  It is possible this is what she has been experiencing  TENA (generalized anxiety disorder)  Comments:  Chronic has been under the treatment of psychiatry    Her sinus symptoms are viral.  They just started there is no sign on assessment that anything is related to a bacterial infection at this time.  I educated her on this I will not treat she can continue over-the-counter conservative management and rest.  I educated that it can take 10 to 14 days for a viral infection to turn into a bacterial sinus infection

## 2024-11-21 ENCOUNTER — APPOINTMENT (OUTPATIENT)
Dept: SLEEP MEDICINE | Facility: CLINIC | Age: 34
End: 2024-11-21
Payer: COMMERCIAL

## 2024-11-22 ENCOUNTER — PATIENT MESSAGE (OUTPATIENT)
Dept: PRIMARY CARE | Facility: CLINIC | Age: 34
End: 2024-11-22
Payer: COMMERCIAL

## 2024-11-22 DIAGNOSIS — F41.1 GAD (GENERALIZED ANXIETY DISORDER): ICD-10-CM

## 2024-11-22 DIAGNOSIS — R07.89 CHEST DISCOMFORT: Primary | ICD-10-CM

## 2024-11-22 DIAGNOSIS — M79.7 FIBROMYALGIA: ICD-10-CM

## 2024-11-23 LAB — C TETANI TOXOID IGG SERPL IA-ACNC: 1.7 IU/ML

## 2024-11-24 LAB
S PN DA SERO 19F IGG SER-MCNC: 9.93 UG/ML
S PNEUM DA 1 IGG SER-MCNC: 3.3 UG/ML
S PNEUM DA 10A IGG SER-MCNC: 1.03 UG/ML
S PNEUM DA 11A IGG SER-MCNC: 0.73 UG/ML
S PNEUM DA 12F IGG SER-MCNC: 0.44 UG/ML
S PNEUM DA 14 IGG SER-MCNC: 0.45 UG/ML
S PNEUM DA 15B IGG SER-MCNC: 0.93 UG/ML
S PNEUM DA 17F IGG SER-MCNC: 2.09 UG/ML
S PNEUM DA 18C IGG SER-MCNC: 3.79 UG/ML
S PNEUM DA 19A IGG SER-MCNC: 5.53 UG/ML
S PNEUM DA 2 IGG SER-MCNC: 0.53 UG/ML
S PNEUM DA 20A IGG SER-MCNC: 0.86 UG/ML
S PNEUM DA 22F IGG SER-MCNC: 0.43 UG/ML
S PNEUM DA 23F IGG SER-MCNC: 0.69 UG/ML
S PNEUM DA 3 IGG SER-MCNC: 2.04 UG/ML
S PNEUM DA 33F IGG SER-MCNC: 0.32 UG/ML
S PNEUM DA 4 IGG SER-MCNC: 1.47 UG/ML
S PNEUM DA 5 IGG SER-MCNC: 3.91 UG/ML
S PNEUM DA 6B IGG SER-MCNC: 1.68 UG/ML
S PNEUM DA 7F IGG SER-MCNC: 2.94 UG/ML
S PNEUM DA 8 IGG SER-MCNC: 1.51 UG/ML
S PNEUM DA 9N IGG SER-MCNC: 1.27 UG/ML
S PNEUM DA 9V IGG SER-MCNC: 0.84 UG/ML
S PNEUM SEROTYPE IGG SER-IMP: NORMAL

## 2024-12-03 ENCOUNTER — OFFICE VISIT (OUTPATIENT)
Dept: OBSTETRICS AND GYNECOLOGY | Facility: CLINIC | Age: 34
End: 2024-12-03
Payer: COMMERCIAL

## 2024-12-03 ENCOUNTER — APPOINTMENT (OUTPATIENT)
Dept: SLEEP MEDICINE | Facility: CLINIC | Age: 34
End: 2024-12-03
Payer: COMMERCIAL

## 2024-12-03 VITALS
DIASTOLIC BLOOD PRESSURE: 76 MMHG | WEIGHT: 154 LBS | BODY MASS INDEX: 25.66 KG/M2 | SYSTOLIC BLOOD PRESSURE: 118 MMHG | HEIGHT: 65 IN

## 2024-12-03 DIAGNOSIS — Z30.09 BIRTH CONTROL COUNSELING: Primary | ICD-10-CM

## 2024-12-03 DIAGNOSIS — N89.8 VAGINAL DISCHARGE: ICD-10-CM

## 2024-12-03 PROCEDURE — 99214 OFFICE O/P EST MOD 30 MIN: CPT | Performed by: NURSE PRACTITIONER

## 2024-12-03 PROCEDURE — 1036F TOBACCO NON-USER: CPT | Performed by: NURSE PRACTITIONER

## 2024-12-03 PROCEDURE — 87205 SMEAR GRAM STAIN: CPT

## 2024-12-03 PROCEDURE — 3008F BODY MASS INDEX DOCD: CPT | Performed by: NURSE PRACTITIONER

## 2024-12-03 RX ORDER — DROSPIRENONE 4 MG/1
4 TABLET, FILM COATED ORAL DAILY
Qty: 90 TABLET | Refills: 1 | Status: SHIPPED | OUTPATIENT
Start: 2024-12-03

## 2024-12-03 ASSESSMENT — ENCOUNTER SYMPTOMS
SHORTNESS OF BREATH: 1
FATIGUE: 1

## 2024-12-03 NOTE — PROGRESS NOTES
"Chief Complaint    Contraception        HPI    Tried Maria Fernanda for PMDD. Issues with chest pain after starting. Stopped taking, saw PCP and workup negative.   Last edited by CHAY Harding on 12/3/2024 11:21 AM.         34 y.o. G0 female presents for birth control counseling.     She was started on Maria Fernanda for management of PMDD related symptoms in August this year.  Was on this for about 3 months. Did feel her PMDD symptoms improve, felt more energy to the level she started to have some insomnia.  States she had started to have symptoms of chest pain. Thought it may have been stress related but then correlated this starting around the time she started the pill.  Stopped the pill. Saw her PCP, workup including labs and EKG negative.   Since stopping the pill, chest pain improved but does get intermittent times of feeling like she is more winded, and her heart racing with exertion.   States she had a return of chest pain after taking a Plan B recently as well.   Fatigue worsening again since stopping the pill as well.     Patient's menstrual cycles are regular every 28 days, lasting 4 days.     Also reports \"pinching feeling\" near clitoris. Symptoms intermittent. Onset 1-2 days ago. Reports vaginal discharge. Denies itching, odor.     She is , sexually active without concerns.      She is non-smoker  PMH: Anxiety, depression, fibromyalgia    /76   Ht 1.651 m (5' 5\")   Wt 69.9 kg (154 lb)   BMI 25.63 kg/m²      Current Outpatient Medications   Medication Instructions    azelastine (Astelin) 137 mcg (0.1 %) nasal spray 2 sprays, Each Nostril, Daily, Shake gently then remove cap and point spray tip sideways toward your ears before applying. After use, clean tip.    cholecalciferol, vitamin D3, 325 mcg (13,000 unit) capsule oral    FLUoxetine (PROzac) 10 mg capsule TAKE 1 CAPSULE BY MOUTH DAILY ALONG WITH 40 MG TO EQUAL 50 MG    FLUoxetine (PROzac) 40 mg capsule 1 capsule, oral, Daily (0630)    " fluticasone (Flonase) 50 mcg/actuation nasal spray Administer 2 sprays per nostril once daily 1 hour before bedtime. Shake gently then remove cap and point spray tip sideways toward your ears before applying. Prime pump before first use. After use, clean tip.    ibuprofen (MOTRIN) 400 mg, oral, Daily PRN    iron polysac/iron heme/FA/B12 (IRON PS-IRON HEM POLY-FA-B12 ORAL) oral    vit B complex no.12/niacin,B3, (VITAMIN B COMPLEX NO.12-NIACIN ORAL) oral        Review of Systems   Constitutional:  Positive for fatigue.   Respiratory:  Positive for shortness of breath.    Cardiovascular:  Positive for chest pain.   Genitourinary:  Positive for vaginal discharge and vaginal pain.   All other systems reviewed and are negative.       Physical Exam  Constitutional:       Appearance: Normal appearance.   HENT:      Head: Normocephalic.      Nose: Nose normal.   Pulmonary:      Effort: Pulmonary effort is normal.   Genitourinary:     General: Normal vulva.      Vagina: Vaginal discharge present.      Cervix: Normal.   Musculoskeletal:         General: Normal range of motion.      Cervical back: Normal range of motion and neck supple.   Neurological:      Mental Status: She is alert.   Psychiatric:         Mood and Affect: Mood normal.         Behavior: Behavior normal.          Assessment/Plan:   1. Birth control counseling (Primary)  -Discussion in depth today regarding patients symptoms and concerns. Possible atypical side effect of birth control pills. Discussed switching to progesterone only method such as POPs, Depo, IUD vs Nexplanon.   -Rx sent: drospirenone, contraceptive, (Slynd) 4 mg (28) tablet; Take 1 tablet by mouth once daily.  Dispense: 90 tablet; Refill: 1  -PCP discussed with her possible referral to cardiology for further evaluation. Patient considering this.   -Follow up yearly exams and as needed.     2. Vaginal discharge  -Collected: Vaginitis Gram Stain For Bacterial Vaginosis + Yeast   -Will notify of  result.

## 2024-12-04 ENCOUNTER — APPOINTMENT (OUTPATIENT)
Dept: SLEEP MEDICINE | Facility: CLINIC | Age: 34
End: 2024-12-04
Payer: COMMERCIAL

## 2024-12-04 LAB
CLUE CELLS VAG LPF-#/AREA: NORMAL /[LPF]
NUGENT SCORE: 0
YEAST VAG WET PREP-#/AREA: NORMAL

## 2024-12-05 ENCOUNTER — LAB (OUTPATIENT)
Dept: LAB | Facility: LAB | Age: 34
End: 2024-12-05
Payer: COMMERCIAL

## 2024-12-05 DIAGNOSIS — E83.10 DISORDER OF IRON METABOLISM: ICD-10-CM

## 2024-12-05 LAB
FERRITIN SERPL-MCNC: 85 NG/ML (ref 8–150)
IRON SATN MFR SERPL: 34 % (ref 25–45)
IRON SERPL-MCNC: 135 UG/DL (ref 35–150)
TIBC SERPL-MCNC: 401 UG/DL (ref 240–445)
UIBC SERPL-MCNC: 266 UG/DL (ref 110–370)

## 2024-12-05 PROCEDURE — 83550 IRON BINDING TEST: CPT

## 2024-12-05 PROCEDURE — 36415 COLL VENOUS BLD VENIPUNCTURE: CPT

## 2024-12-05 PROCEDURE — 83540 ASSAY OF IRON: CPT

## 2024-12-05 PROCEDURE — 82728 ASSAY OF FERRITIN: CPT

## 2024-12-09 ENCOUNTER — PROCEDURE VISIT (OUTPATIENT)
Dept: SLEEP MEDICINE | Facility: CLINIC | Age: 34
End: 2024-12-09
Payer: COMMERCIAL

## 2024-12-09 DIAGNOSIS — G47.52 DREAM ENACTMENT BEHAVIOR: ICD-10-CM

## 2024-12-09 NOTE — PROGRESS NOTES
Primary Care Physician: Mehreen Montilla, APRN-CNP   Date of Visit: 12/13/2024 11:30 AM EST  Type of Visit: New      Chief Complaint:  Chief Complaint   Patient presents with    Establish Care    Chest Pain     Trouble with Fatigue        HPI  Mahi Cuba 34 y.o. female with a PMH of anemia, fibromyalgia presents today for chest pain. EKG in the past has been normal. Recent CBC, CMP, mag, d-dimer, TSH unremarkable. Denies cardiac history. Had chest pain with birth control, improved with stopping. Sometimes occurs with exercise. Associated SOB and palpitations. Unusual fatigue that is constant. Sleep study done earlier this week.    Review of Systems   Review of Systems   Constitutional:  Positive for appetite change and fatigue. Negative for unexpected weight change.   Respiratory:  Positive for shortness of breath.    Cardiovascular:  Positive for chest pain and palpitations.   Psychiatric/Behavioral:  Positive for sleep disturbance.    All other systems reviewed and are negative.     12 points review of systems are negative expect for the above    Social History:  Social History     Socioeconomic History    Marital status:      Spouse name: NIKKI OCHOA    Number of children: Not on file    Years of education: Not on file    Highest education level: Not on file   Occupational History    Not on file   Tobacco Use    Smoking status: Never    Smokeless tobacco: Never   Vaping Use    Vaping status: Never Used   Substance and Sexual Activity    Alcohol use: Yes     Comment: SOCIALLY    Drug use: Never    Sexual activity: Yes     Partners: Male     Birth control/protection: Condom Male   Other Topics Concern    Not on file   Social History Narrative    Not on file     Social Drivers of Health     Financial Resource Strain: Not on file   Food Insecurity: Not on file   Transportation Needs: Not on file   Physical Activity: Not on file   Stress: Not on file   Social Connections: Not on file   Intimate Partner Violence:  Not on file   Housing Stability: Not on file        Past Medical History:  Past Medical History:   Diagnosis Date    Alcohol intoxication (CMS-HCC) 02/13/2024    Allergic rhinitis     Anemia     Drug withdrawal syndrome (Multi) 10/21/2017    Myofascial pain 03/27/2017       Past Surgical History:  Past Surgical History:   Procedure Laterality Date    HERNIA REPAIR  2015    MISENTARY HERNIA    ORTHODONTIC TREATMENT         Family History:  Family History   Problem Relation Name Age of Onset    Breast cancer Mother Renetta     Depression Mother Renetta     Anxiety disorder Mother Renetta     Cancer Mother Renetta     Hypertension Mother Renetta     Mental illness Mother Renetta     Depression Sister      Anxiety disorder Sister      Breast cancer Mother's Sister      Hypertension Mother's Sister      Hypertension Maternal Grandmother mariann     Hypertension Maternal Grandfather elizabeth     Stroke Maternal Grandfather elizabeth     Hypertension Paternal Grandmother      Hypertension Paternal Grandfather      Alcohol abuse Mother's Sister nicola     Hypertension Mother's Sister nicola     Asthma Sister kyaw     Depression Sister kyaw     Mental illness Sister kyaw     Cancer Mother's Sister ade         Objective:   Physical Exam  Vitals reviewed.   Constitutional:       Appearance: Normal appearance. She is normal weight.   HENT:      Head: Normocephalic and atraumatic.   Neck:      Vascular: No carotid bruit or JVD.   Cardiovascular:      Rate and Rhythm: Normal rate and regular rhythm.      Pulses: Normal pulses.      Heart sounds: Normal heart sounds.   Pulmonary:      Effort: Pulmonary effort is normal.      Breath sounds: Normal breath sounds.   Chest:      Chest wall: No tenderness.   Abdominal:      General: Abdomen is flat. Bowel sounds are normal.      Palpations: Abdomen is soft.   Skin:     General: Skin is warm and dry.   Neurological:      General: No focal deficit present.      Mental Status: She is alert and  "oriented to person, place, and time. Mental status is at baseline.   Psychiatric:         Mood and Affect: Mood normal.         Behavior: Behavior normal.             9/4/2024     2:57 PM 9/27/2024     1:49 PM 10/7/2024     9:24 AM 11/20/2024     9:24 AM 12/3/2024    11:20 AM 12/10/2024     4:28 AM 12/13/2024    11:45 AM   Vitals   Systolic 115 112 105 119 118 115 112   Diastolic 77 74 74 88 76 81 76   BP Location       Left arm   Heart Rate 78 80 80 113   94   Resp  16    20    Height 1.651 m (5' 5\") 1.651 m (5' 5\") 1.651 m (5' 5\") 1.651 m (5' 5\") 1.651 m (5' 5\") 1.651 m (5' 5\") 1.651 m (5' 5\")   Weight (lb) 155 155 154 153 154 154.1 156   BMI 25.79 kg/m2 25.79 kg/m2 25.63 kg/m2 25.46 kg/m2 25.63 kg/m2 25.64 kg/m2 25.96 kg/m2   BSA (m2) 1.8 m2 1.8 m2 1.79 m2 1.78 m2 1.79 m2 1.79 m2 1.8 m2   Visit Report Report Report Report Report Report  Report        Allergies:  No Known Allergies    Medications:  Current Outpatient Medications   Medication Instructions    azelastine (Astelin) 137 mcg (0.1 %) nasal spray 2 sprays, Each Nostril, Daily, Shake gently then remove cap and point spray tip sideways toward your ears before applying. After use, clean tip.    cholecalciferol, vitamin D3, 325 mcg (13,000 unit) capsule Take by mouth.    drospirenone, contraceptive, (Slynd) 4 mg (28) tablet 1 tablet, oral, Daily    FLUoxetine (PROzac) 10 mg capsule TAKE 1 CAPSULE BY MOUTH DAILY ALONG WITH 40 MG TO EQUAL 50 MG    FLUoxetine (PROzac) 40 mg capsule 1 capsule, Daily (0630)    fluticasone (Flonase) 50 mcg/actuation nasal spray Administer 2 sprays per nostril once daily 1 hour before bedtime. Shake gently then remove cap and point spray tip sideways toward your ears before applying. Prime pump before first use. After use, clean tip.    ibuprofen (MOTRIN) 400 mg, Daily PRN    iron polysac/iron heme/FA/B12 (IRON PS-IRON HEM POLY-FA-B12 ORAL) Take by mouth.    QUEtiapine (SEROQUEL) 25 mg, Nightly    triamcinolone (Nasacort) 55 mcg " nasal inhaler 2 sprays, Daily    vit B complex no.12/niacin,B3, (VITAMIN B COMPLEX NO.12-NIACIN ORAL) Take by mouth.        Labs and Imaging:     Lab Results   Component Value Date    WBC 9.4 11/20/2024    HGB 15.2 11/20/2024    HCT 45.2 11/20/2024     11/20/2024    CHOL 119 03/14/2024    TRIG 50 03/14/2024    HDL 57.4 03/14/2024    ALT 15 11/20/2024    AST 15 11/20/2024     11/20/2024    K 4.3 11/20/2024     11/20/2024    CREATININE 0.81 11/20/2024    BUN 11 11/20/2024    CO2 24 11/20/2024    TSH 1.66 11/20/2024       Echocardiogram: No results found for this or any previous visit from the past 1825 days.    Stress Testing: No results found for this or any previous visit from the past 1825 days.    Cardiac Catheterization: No results found for this or any previous visit from the past 1825 days.    Cardiac Scoring: No results found for this or any previous visit from the past 1825 days.    AAA : No results found for this or any previous visit from the past 1825 days.    OTHER: No results found for this or any previous visit from the past 1825 days.      The ASCVD Risk score (Dana DK, et al., 2019) failed to calculate for the following reasons:    The 2019 ASCVD risk score is only valid for ages 40 to 79         Assessment:   34 y.o. female with a PMH of anemia, fibromyalgia presents today for chest pain. EKG in the past has been normal. Reports chest pain, described as squeezing and pressure. Can occur with rest or exercise. Associated SOB and palpitations. Unusual fatigue that is constant. Sleep study done earlier this week.    1. Chest discomfort  Referral to Cardiology    Stress Test    Transthoracic Echo (TTE) Complete      2. Shortness of breath  Transthoracic Echo (TTE) Complete      3. Fibromyalgia  Referral to Cardiology      4. TENA (generalized anxiety disorder)  Referral to Cardiology           Plan:  -Exercise stress test  -TTE for structural and functional assessment  -Will call with  results  ____________________________________________________________  Noé Salas CNP  Cardiovascular Medicine   [facility]    Lab review: I have personally reviewed the laboratory result(s) CBC, CMP, mag, d-dimer, TSH  Diagnostic review: I have personally reviewed the result(s) of the EKG

## 2024-12-10 VITALS
HEIGHT: 65 IN | WEIGHT: 154.1 LBS | SYSTOLIC BLOOD PRESSURE: 115 MMHG | OXYGEN SATURATION: 98 % | BODY MASS INDEX: 25.67 KG/M2 | DIASTOLIC BLOOD PRESSURE: 81 MMHG | RESPIRATION RATE: 20 BRPM

## 2024-12-10 ASSESSMENT — SLEEP AND FATIGUE QUESTIONNAIRES
HOW LIKELY ARE YOU TO NOD OFF OR FALL ASLEEP WHEN YOU ARE A PASSENGER IN A CAR FOR AN HOUR WITHOUT A BREAK: HIGH CHANCE OF DOZING
HOW LIKELY ARE YOU TO NOD OFF OR FALL ASLEEP WHILE LYING DOWN TO REST IN THE AFTERNOON WHEN CIRCUMSTANCES PERMIT: HIGH CHANCE OF DOZING
HOW LIKELY ARE YOU TO NOD OFF OR FALL ASLEEP WHILE WATCHING TV: HIGH CHANCE OF DOZING
HOW LIKELY ARE YOU TO NOD OFF OR FALL ASLEEP WHILE SITTING QUIETLY AFTER LUNCH WITHOUT ALCOHOL: MODERATE CHANCE OF DOZING
ESS-CHAD TOTAL SCORE: 21
SITING INACTIVE IN A PUBLIC PLACE LIKE A CLASS ROOM OR A MOVIE THEATER: HIGH CHANCE OF DOZING
HOW LIKELY ARE YOU TO NOD OFF OR FALL ASLEEP IN A CAR, WHILE STOPPED FOR A FEW MINUTES IN TRAFFIC: HIGH CHANCE OF DOZING
HOW LIKELY ARE YOU TO NOD OFF OR FALL ASLEEP WHILE SITTING AND READING: HIGH CHANCE OF DOZING
HOW LIKELY ARE YOU TO NOD OFF OR FALL ASLEEP WHILE SITTING AND TALKING TO SOMEONE: SLIGHT CHANCE OF DOZING

## 2024-12-10 NOTE — PROGRESS NOTES
[facility] TECH NOTE:     Patient: Mahi Cuba   MRN//AGE: [id number]  1990  34 y.o.   Technologist: Carli Alamo   Room: 4   Service Date: 2024        Sleep Testing Location: [place]   Neck: 32.5 cm  [place]: 21    TECHNOLOGIST SLEEP STUDY PROCEDURE NOTE:   This sleep study is being conducted according to the policies and procedures outlined by the AAS accreditation standards.  The sleep study procedure and processes involved during this appointment was explained to the patient/patient’s family, questions were answered. The patient/family verbalized understanding.      The patient is a 34 year-old female scheduled for a diagnostic PSG with limb leads with montage of: PSG with the addition of arm leads.    The study that was ultimately completed was a diagnostic PSG.    The full study was completed.  Patient questionnaires completed?: yes     Consents signed? yes    Initial Fall Risk Screening:     Mahi has not fallen in the last 6 months.  Mhai does not have a fear of falling. She does not need assistance with sitting, standing, or walking. She does not need assistance walking in her home. She does not need assistance in an unfamiliar setting. The patient is not using an assistive device.     Brief Study observations: Split criteria was not met.  No snoring was heard.  Arousals/body movement were observed frequently.      After the procedure, the patient/family was informed to ensure followup with ordering clinician for testing results.      Technologist: LORRAINE Plascencia

## 2024-12-13 ENCOUNTER — OFFICE VISIT (OUTPATIENT)
Dept: CARDIOLOGY | Facility: CLINIC | Age: 34
End: 2024-12-13
Payer: COMMERCIAL

## 2024-12-13 VITALS
HEART RATE: 94 BPM | SYSTOLIC BLOOD PRESSURE: 112 MMHG | WEIGHT: 156 LBS | BODY MASS INDEX: 25.99 KG/M2 | OXYGEN SATURATION: 98 % | DIASTOLIC BLOOD PRESSURE: 76 MMHG | HEIGHT: 65 IN

## 2024-12-13 DIAGNOSIS — M79.7 FIBROMYALGIA: ICD-10-CM

## 2024-12-13 DIAGNOSIS — R07.89 CHEST DISCOMFORT: Primary | ICD-10-CM

## 2024-12-13 DIAGNOSIS — R06.02 SHORTNESS OF BREATH: ICD-10-CM

## 2024-12-13 DIAGNOSIS — F41.1 GAD (GENERALIZED ANXIETY DISORDER): ICD-10-CM

## 2024-12-13 PROCEDURE — 1036F TOBACCO NON-USER: CPT | Performed by: NURSE PRACTITIONER

## 2024-12-13 PROCEDURE — 3008F BODY MASS INDEX DOCD: CPT | Performed by: NURSE PRACTITIONER

## 2024-12-13 PROCEDURE — 99204 OFFICE O/P NEW MOD 45 MIN: CPT | Performed by: NURSE PRACTITIONER

## 2024-12-13 PROCEDURE — 99214 OFFICE O/P EST MOD 30 MIN: CPT | Performed by: NURSE PRACTITIONER

## 2024-12-13 RX ORDER — QUETIAPINE FUMARATE 25 MG/1
25 TABLET, FILM COATED ORAL NIGHTLY
COMMUNITY

## 2024-12-13 RX ORDER — TRIAMCINOLONE ACETONIDE 55 UG/1
2 SPRAY, METERED NASAL DAILY
COMMUNITY

## 2024-12-13 ASSESSMENT — PATIENT HEALTH QUESTIONNAIRE - PHQ9
2. FEELING DOWN, DEPRESSED OR HOPELESS: NEARLY EVERY DAY
1. LITTLE INTEREST OR PLEASURE IN DOING THINGS: NEARLY EVERY DAY
SUM OF ALL RESPONSES TO PHQ9 QUESTIONS 1 AND 2: 6

## 2024-12-13 ASSESSMENT — ENCOUNTER SYMPTOMS
APPETITE CHANGE: 1
SLEEP DISTURBANCE: 1
UNEXPECTED WEIGHT CHANGE: 0
FATIGUE: 1
SHORTNESS OF BREATH: 1
PALPITATIONS: 1

## 2024-12-19 ENCOUNTER — HOSPITAL ENCOUNTER (OUTPATIENT)
Dept: CARDIOLOGY | Facility: CLINIC | Age: 34
Discharge: HOME | End: 2024-12-19
Payer: COMMERCIAL

## 2024-12-19 DIAGNOSIS — R07.89 CHEST DISCOMFORT: ICD-10-CM

## 2024-12-19 PROCEDURE — 93017 CV STRESS TEST TRACING ONLY: CPT

## 2024-12-20 ENCOUNTER — HOSPITAL ENCOUNTER (OUTPATIENT)
Dept: CARDIOLOGY | Facility: CLINIC | Age: 34
Discharge: HOME | End: 2024-12-20
Payer: COMMERCIAL

## 2024-12-20 DIAGNOSIS — R07.89 CHEST DISCOMFORT: ICD-10-CM

## 2024-12-20 DIAGNOSIS — R06.02 SHORTNESS OF BREATH: ICD-10-CM

## 2024-12-20 PROCEDURE — 93306 TTE W/DOPPLER COMPLETE: CPT

## 2024-12-31 LAB
AORTIC VALVE PEAK VELOCITY: 1 M/S
AV PEAK GRADIENT: 4 MMHG
AVA (PEAK VEL): 2.73 CM2
EJECTION FRACTION APICAL 4 CHAMBER: 60.7
EJECTION FRACTION: 60 %
LEFT ATRIUM VOLUME AREA LENGTH INDEX BSA: 29.4 ML/M2
LEFT VENTRICLE INTERNAL DIMENSION DIASTOLE: 4.4 CM (ref 3.5–6)
LEFT VENTRICULAR OUTFLOW TRACT DIAMETER: 2.1 CM
MITRAL VALVE E/A RATIO: 1.66
MITRAL VALVE E/E' RATIO: 3.8
RIGHT VENTRICLE FREE WALL PEAK S': 10.8 CM/S
RIGHT VENTRICLE PEAK SYSTOLIC PRESSURE: 19.8 MMHG
TRICUSPID ANNULAR PLANE SYSTOLIC EXCURSION: 2.5 CM

## 2025-01-10 ENCOUNTER — OFFICE VISIT (OUTPATIENT)
Dept: SLEEP MEDICINE | Facility: CLINIC | Age: 35
End: 2025-01-10
Payer: COMMERCIAL

## 2025-01-10 VITALS
OXYGEN SATURATION: 99 % | BODY MASS INDEX: 26.81 KG/M2 | DIASTOLIC BLOOD PRESSURE: 69 MMHG | RESPIRATION RATE: 16 BRPM | HEART RATE: 89 BPM | SYSTOLIC BLOOD PRESSURE: 101 MMHG | WEIGHT: 161.1 LBS

## 2025-01-10 DIAGNOSIS — G47.52 DREAM ENACTMENT BEHAVIOR: ICD-10-CM

## 2025-01-10 DIAGNOSIS — R40.0 DAYTIME SLEEPINESS: Primary | ICD-10-CM

## 2025-01-10 DIAGNOSIS — G25.81 RLS (RESTLESS LEGS SYNDROME): ICD-10-CM

## 2025-01-10 PROCEDURE — 99214 OFFICE O/P EST MOD 30 MIN: CPT | Performed by: INTERNAL MEDICINE

## 2025-01-10 ASSESSMENT — SLEEP AND FATIGUE QUESTIONNAIRES
SITING INACTIVE IN A PUBLIC PLACE LIKE A CLASS ROOM OR A MOVIE THEATER: HIGH CHANCE OF DOZING
HOW LIKELY ARE YOU TO NOD OFF OR FALL ASLEEP WHILE SITTING AND TALKING TO SOMEONE: SLIGHT CHANCE OF DOZING
HOW LIKELY ARE YOU TO NOD OFF OR FALL ASLEEP WHILE SITTING AND READING: HIGH CHANCE OF DOZING
HOW LIKELY ARE YOU TO NOD OFF OR FALL ASLEEP WHILE WATCHING TV: HIGH CHANCE OF DOZING
HOW LIKELY ARE YOU TO NOD OFF OR FALL ASLEEP IN A CAR, WHILE STOPPED FOR A FEW MINUTES IN TRAFFIC: SLIGHT CHANCE OF DOZING
ESS-CHAD TOTAL SCORE: 20
HOW LIKELY ARE YOU TO NOD OFF OR FALL ASLEEP WHEN YOU ARE A PASSENGER IN A CAR FOR AN HOUR WITHOUT A BREAK: HIGH CHANCE OF DOZING
HOW LIKELY ARE YOU TO NOD OFF OR FALL ASLEEP WHILE LYING DOWN TO REST IN THE AFTERNOON WHEN CIRCUMSTANCES PERMIT: HIGH CHANCE OF DOZING
HOW LIKELY ARE YOU TO NOD OFF OR FALL ASLEEP WHILE SITTING QUIETLY AFTER LUNCH WITHOUT ALCOHOL: HIGH CHANCE OF DOZING

## 2025-01-10 ASSESSMENT — COLUMBIA-SUICIDE SEVERITY RATING SCALE - C-SSRS
1. IN THE PAST MONTH, HAVE YOU WISHED YOU WERE DEAD OR WISHED YOU COULD GO TO SLEEP AND NOT WAKE UP?: NO
2. HAVE YOU ACTUALLY HAD ANY THOUGHTS OF KILLING YOURSELF?: NO
6. HAVE YOU EVER DONE ANYTHING, STARTED TO DO ANYTHING, OR PREPARED TO DO ANYTHING TO END YOUR LIFE?: NO

## 2025-01-10 ASSESSMENT — PAIN SCALES - GENERAL: PAINLEVEL_OUTOF10: 7

## 2025-01-10 ASSESSMENT — PATIENT HEALTH QUESTIONNAIRE - PHQ9
1. LITTLE INTEREST OR PLEASURE IN DOING THINGS: NOT AT ALL
SUM OF ALL RESPONSES TO PHQ9 QUESTIONS 1 AND 2: 0
2. FEELING DOWN, DEPRESSED OR HOPELESS: NOT AT ALL

## 2025-01-10 NOTE — PROGRESS NOTES
[facility] SLEEP MEDICINE   FOLLOW-UP VISIT NOTE    PCP: Mehreen Montilla, CHAY    HISTORY OF PRESENT ILLNESS     Patient ID: Mahi Cuba is a 34 y.o. female who presents for follow-up after sleep study    Patient is here alone today.  To review, patient's medical history is notable for seasonal allergies, RLS, RBD, nightmare disorder, and hypersomnia. Patient started taking OCP in Aug 2024 and since then, she was waking up 2x per night for unknown reasons but she feels energetic in daytime. Prior OCP intake, she sleeps thru the night. Her dream enactment behavior consists of sleeptalking, screaming in her sleep, and karate-chopping  while asleep. She was given Prazosin which did not work at all for her nightmares. She takes fluoxetine for depression and anxiety. Tried weighted blanket for anxiety but it made panic attack worse.  She had history of suicidal thoughts when she is not taking any antidepressants.     Interval History  Patient was last seen in 9/27/2024. Plan was to do PSG-MSLT for narcolepsy evaluation.  Since last visit, her health insurance denied PSG-MSLT; hence, only PSG was completed and it showed no BARBARA nor PLMD but had RWA.  says that she still has parasomnias like sleep-talking and acting out dreams every night or every other night. Patient would sit up talking. Dream content is always negative at different degrees.   Patient started having chest pain on OCP and even Plan B; hence, she stopped taking OCP and Plan B. Patient states that she is not eating enough but she is still gaining weight.     RLS Follow-up:   Still have RLS 2x per week but not bothersome. Patient thinks it is related to anxiety    SLEEP STUDY HISTORY (personally reviewed raw data such as interpretation report, data sheet, hypnogram, and titration table if available and applicable)  PSG 12/9/2024: RDI3% 3.8 (Supine RDI3% 6.2, non-supine RDI3% 2), RDI4% 0.7 (Supine RDI4% 1.2, non-supine RDI4% 0.3),  SpO2 emilie 94%, sleep efficiency 85.2%sleep latency 21 mins, REM latency 127.5 mins (med list showed fluoxetine and seroquel)    SLEEP-WAKE SCHEDULE  Bedtime:  12 MN daily  Subjective sleep latency: 15 minutes, sometimes 1 hour  Number of awakenings: none. Patient sleeps thru the night.  Final wake time:  8-9 AM daily  Out of bed time:  10:30 AM daily  Shift work: No   Naps: 3 times per week for 2 hours  Feels rested after a nap: No   Average sleep duration (excluding naps): 8 hours    SLEEP ENVIRONMENT  Sleep location: bed  Sleep status: sleeps with   Preferred sleep position: side    SOCIAL HISTORY  Smoking: no  ETOH: 1 bottle wine once a month  Marijuana: no  Caffeine: 175 to 200 mg 3 times per week (only when going out to drive somewhere)  Sleep aids: yes on Seroquel    WEIGHT: gained 6 lbs in 4 months     Claustrophobia: Yes     REVIEW OF SYSTEMS     All other systems have been reviewed and are negative.    ALLERGIES     No Known Allergies    MEDICATIONS     Current Outpatient Medications   Medication Sig Dispense Refill    azelastine (Astelin) 137 mcg (0.1 %) nasal spray Administer 2 sprays into each nostril once daily. Shake gently then remove cap and point spray tip sideways toward your ears before applying. After use, clean tip. 30 mL 12    cholecalciferol, vitamin D3, 325 mcg (13,000 unit) capsule Take by mouth.      FLUoxetine (PROzac) 40 mg capsule Take 1 capsule (40 mg) by mouth early in the morning..      ibuprofen (Motrin) capsule Take 2 capsules (400 mg) by mouth once daily as needed.      QUEtiapine (SEROquel) 25 mg tablet Take 1 tablet (25 mg) by mouth once daily at bedtime. 1/2 to 1 tab nightly      triamcinolone (Nasacort) 55 mcg nasal inhaler Administer 2 sprays into each nostril once daily.      vit B complex no.12/niacin,B3, (VITAMIN B COMPLEX NO.12-NIACIN ORAL) Take by mouth.      drospirenone, contraceptive, (Slynd) 4 mg (28) tablet Take 1 tablet by mouth once daily. (Patient not  taking: Reported on 1/10/2025) 90 tablet 1    FLUoxetine (PROzac) 10 mg capsule TAKE 1 CAPSULE BY MOUTH DAILY ALONG WITH 40 MG TO EQUAL 50 MG (Patient not taking: Reported on 1/10/2025)      fluticasone (Flonase) 50 mcg/actuation nasal spray Administer 2 sprays per nostril once daily 1 hour before bedtime. Shake gently then remove cap and point spray tip sideways toward your ears before applying. Prime pump before first use. After use, clean tip. (Patient not taking: Reported on 1/10/2025) 16 g 3    iron polysac/iron heme/FA/B12 (IRON PS-IRON HEM POLY-FA-B12 ORAL) Take by mouth. (Patient not taking: Reported on 1/10/2025)       No current facility-administered medications for this visit.       Active Problems, Allergy List, Medication List, and PMH/PSH/FH/Social Hx have been reviewed and reconciled in chart. No significant changes unless documented in the pertinent chart section. Updates made when necessary.       PHYSICAL EXAM     VITAL SIGNS: /69   Pulse 89   Resp 16   Wt 73.1 kg (161 lb 1.6 oz)   SpO2 99%   BMI 26.81 kg/m²     NECK CIRCUMFERENCE: 13.5 inches    Today ESS: 20  Last visit ESS: 13  Last visit RACHEL: 20    Physical Exam  Constitutional: Awake, not in distress  Head: Normocephalic, atraumatic  Skin: Warm, no rash  Neuro: No tremors, moves all extremities  Psych: alert and oriented to time, place, and person    RESULTS/DATA     Iron (ug/dL)   Date Value   12/05/2024 135   09/28/2024 68     % Saturation (%)   Date Value   12/05/2024 34   09/28/2024 17 (L)     TIBC (ug/dL)   Date Value   12/05/2024 401   09/28/2024 406     Ferritin (ng/mL)   Date Value   12/05/2024 85   09/28/2024 55       Bicarbonate   Date Value Ref Range Status   11/20/2024 24 21 - 32 mmol/L Final       ASSESSMENT/PLAN     Mahi Cuba is a 34 y.o. female who returns in [facility] for the following problems:    EXCESSIVE DAYTIME SLEEPINESS  - probably due to BARBARA, r/o narcolepsy. Differential  diagnoses are BARBARA, PLMD, and insufficient sleep syndrome, idiopathic hypersomnia, insomnia (e.g. from poor sleep hygiene, psychophysiologic, etc), mental and affective disorders (e.g. depression, anxiety, etc), or medication-induced.  - In order to better assess the underlying disorder, a baseline full night polysomnogram (PSG) followed by a multiple sleep latency test (MSLT) have been ordered. The PSG ensures a regular night of sleep prior to the MSLT and rules out other sleep disorders such as BARBARA or periodic limb movement disorder. The MSLT assesses mean time to sleep onset during 4-5 naps as well as for presence of sleep-onset REM periods.   - Urine drug screen was ordered to be collected and performed on the morning of MSLT.  - Some of the medications the patient is taking may affect the results of this study, such as fluoxetine and seroquel. Patient has been instructed to discontinue these medications for two weeks prior to the test but need to get approval and instructions from prescribing provider or psychiatrist.   - Patient states that she has suicidal ideations when she stopped fluoxetine. In that case, may continue fluoxetine for now.   - Patient has been advised to maintain regular sleep schedule 2 weeks prior to the study and to avoid sleep deprivation.  Gave a sleep diary to patient to document sleep habits and sleep-wake schedule. Advised patient to bring the sleep diary to sleep lab for review. Further treatment plan will be discussed after reviewing the results of this PSG-MSLT.   - Educated patient about sleep and narcolepsy (pathophysiology and treatment options) / PSG-MSLT procedure / driving precautions  - Advised patient not to drive vehicle or operate heavy machinery when sleepy. A person driving while sleepy is 5 times more likely to have an accident. If you feel sleepy, pull over and take a short power nap (sleep for less than 30 minutes). Otherwise, ask somebody to drive you.  - Patient  agreed to the plan and verbalized understanding.    RESTLESS LEG SYNDROME  - 12/5/2024 ferritin 85, TSAT 34%  - Per patient, she gets RLS symptoms 2x per week but they are not bothersome. If RLS symptoms worsen, consider iron infusion and/or gabapentin.  - Supportive management: Avoid triggers of RLS such as caffeine, alcohol, nicotine, medications, and low iron. Taper off or reduce dose of medications that can cause RLS such as but not limited ot antidepressants except Buproprion and Trazodone, 1st generation antihistamines, antipsychotics, anti-emetics except ondansetron, melatonin, topiramate, etc. Consider switch antidepressants to Bupropion if feasible. May take warm bath 2 hours before bedtime. Massage and/or stretch legs while in bed    REM SLEEP BEHAVIOR DISORDER  - Discussed safety precautions of sleeping environment.   - Consider taking melatonin 10 mg 1-2 hours before bedtime.     SEASONAL ALLERGIES   - Continue fluticasone nasal spray 2 sprays per nostril once daily 1 hour before bedtime.  - Continue Azelastine nasal spray, 2 sprays per nostril once daily in the morning.         Follow-up in 2-3 weeks after sleep study.      All of patient's questions were answered. She verbalizes understanding and agreement with my assessment and plan. Refer to after-visit-summary (AVS) for patient education and if applicable, for more details on troubleshooting issues with PAP usage, proper cleaning instructions of PAP supplies, and usual recommended replacement schedule for each of the PAP supplies.

## 2025-02-05 ENCOUNTER — APPOINTMENT (OUTPATIENT)
Dept: ALLERGY | Facility: CLINIC | Age: 35
End: 2025-02-05
Payer: COMMERCIAL

## 2025-02-14 ENCOUNTER — TELEPHONE (OUTPATIENT)
Dept: SLEEP MEDICINE | Facility: CLINIC | Age: 35
End: 2025-02-14
Payer: COMMERCIAL

## 2025-02-14 NOTE — TELEPHONE ENCOUNTER
You ordered a Sleep Study with MSLT for Mahi. She left a message inquiring how long she needs to be off medications prior to testing.

## 2025-04-01 ENCOUNTER — APPOINTMENT (OUTPATIENT)
Dept: PRIMARY CARE | Facility: CLINIC | Age: 35
End: 2025-04-01
Payer: COMMERCIAL

## 2025-04-01 VITALS
HEIGHT: 65 IN | DIASTOLIC BLOOD PRESSURE: 71 MMHG | BODY MASS INDEX: 24.83 KG/M2 | OXYGEN SATURATION: 96 % | HEART RATE: 69 BPM | WEIGHT: 149 LBS | SYSTOLIC BLOOD PRESSURE: 100 MMHG

## 2025-04-01 DIAGNOSIS — D51.9 ANEMIA DUE TO VITAMIN B12 DEFICIENCY, UNSPECIFIED B12 DEFICIENCY TYPE: ICD-10-CM

## 2025-04-01 DIAGNOSIS — R63.0 DECREASED APPETITE: ICD-10-CM

## 2025-04-01 DIAGNOSIS — E55.9 VITAMIN D DEFICIENCY: ICD-10-CM

## 2025-04-01 DIAGNOSIS — R10.32 LEFT LOWER QUADRANT ABDOMINAL PAIN: Primary | ICD-10-CM

## 2025-04-01 DIAGNOSIS — M79.7 FIBROMYALGIA: ICD-10-CM

## 2025-04-01 DIAGNOSIS — F41.1 GAD (GENERALIZED ANXIETY DISORDER): ICD-10-CM

## 2025-04-01 PROCEDURE — 3008F BODY MASS INDEX DOCD: CPT | Performed by: NURSE PRACTITIONER

## 2025-04-01 PROCEDURE — 99214 OFFICE O/P EST MOD 30 MIN: CPT | Performed by: NURSE PRACTITIONER

## 2025-04-01 PROCEDURE — 1036F TOBACCO NON-USER: CPT | Performed by: NURSE PRACTITIONER

## 2025-04-01 ASSESSMENT — ENCOUNTER SYMPTOMS
VOMITING: 0
FREQUENCY: 0
ARTHRALGIAS: 1
MYALGIAS: 1
NAUSEA: 1
BELCHING: 1
ANOREXIA: 1
WEIGHT LOSS: 1
FLATUS: 0
CONSTIPATION: 0
FEVER: 0
HEMATOCHEZIA: 0
DYSURIA: 0
DIARRHEA: 1
HEADACHES: 1
HEMATURIA: 0
ABDOMINAL PAIN: 1

## 2025-04-01 NOTE — PROGRESS NOTES
Subjective   Patient ID: Mahi Cuba is a 34 y.o. female who presents for Follow-up (Patient states she has her second sleep study tomorrow and had to be off all her medications for 8 weeks. ).    34 year old female here for follow up.   Off all meds for a second sleep study. She states her REM sleep was poor.   Off Prozac x 8 weeks.   She is having a lot of GI distress, abd cramping. She wants to be checked for colon cancer  She does not think she has depression. She thinks she has a diff mental health issue. Psychologist rec follow up after the sleep study. Gets rages and angry at HS.   She thinks she was having the GI s&s prior to the stopping of SSRI.   She uses a bedae' at home and does not know if she is having discolored stool or blood in the stool. She is fearful of being dirty. Uses the Bedae' regularly  Anxiety/depression- her psychiatrist does not do testing/eval, she needs to see a psychologist. She wants to stay off the prozac so her eval by the psychologist will be accurate  Will use benedryl for sleep. This helps with the irritation from sound, touch. Sounds cause her anxiety, she uses a diff light due to some light bothering her. She has to sleep naked because she does not like the feeling of clothes on her skin.   She is trying to use tools to help cope.     Abdominal Pain  This is a new problem. The current episode started 1 to 4 weeks ago. The onset quality is undetermined. The problem occurs 2 to 4 times per day. The most recent episode lasted 2 hours. The problem has been waxing and waning. The pain is located in the generalized abdominal region. The pain is at a severity of 7/10. The quality of the pain is aching, cramping, dull and sharp. The abdominal pain does not radiate. Associated symptoms include anorexia, arthralgias, belching, diarrhea, headaches, myalgias, nausea and weight loss. Pertinent negatives include no constipation, dysuria, fever, flatus, frequency, hematochezia,  "hematuria, melena or vomiting. The pain is aggravated by eating. The pain is relieved by Nothing.        Review of Systems   Constitutional:  Positive for weight loss. Negative for fever.   Gastrointestinal:  Positive for abdominal pain, anorexia, diarrhea and nausea. Negative for constipation, flatus, hematochezia, melena and vomiting.   Genitourinary:  Negative for dysuria, frequency and hematuria.   Musculoskeletal:  Positive for arthralgias and myalgias.   Neurological:  Positive for headaches.       Objective   /71   Pulse 69   Ht 1.651 m (5' 5\")   Wt 67.6 kg (149 lb)   SpO2 96%   BMI 24.79 kg/m²     Physical Exam  Constitutional:       Appearance: Normal appearance.   HENT:      Head: Normocephalic and atraumatic.   Cardiovascular:      Rate and Rhythm: Normal rate and regular rhythm.      Pulses: Normal pulses.      Heart sounds: Normal heart sounds.   Pulmonary:      Effort: Pulmonary effort is normal.      Breath sounds: Normal breath sounds.   Skin:     General: Skin is warm and dry.   Neurological:      General: No focal deficit present.      Mental Status: She is alert and oriented to person, place, and time.   Psychiatric:         Mood and Affect: Mood normal.         Behavior: Behavior normal.      Comments: Appears fatigued         Assessment/Plan   Diagnoses and all orders for this visit:  Left lower quadrant abdominal pain  Comments:  psychosomatic vs withdrawal SSRI. d/w pt  Orders:  -     Referral to Gastroenterology; Future  -     Comprehensive Metabolic Panel; Future  Decreased appetite  Comments:  withdrawal frm SSRI??  Orders:  -     Referral to Gastroenterology; Future  Vitamin D deficiency  -     Vitamin D 25-Hydroxy,Total (for eval of Vitamin D levels); Future  Anemia due to vitamin B12 deficiency, unspecified B12 deficiency type  -     Vitamin B12; Future  Fibromyalgia  -     Magnesium; Future  TENA (generalized anxiety disorder)  Comments:  I agree with her. she needs the " appropriate testing by the appropriate provider for mental health concerns.  Other orders  -     Follow Up In Primary Care - Established

## 2025-04-02 ENCOUNTER — CLINICAL SUPPORT (OUTPATIENT)
Dept: SLEEP MEDICINE | Facility: CLINIC | Age: 35
End: 2025-04-02
Payer: COMMERCIAL

## 2025-04-02 DIAGNOSIS — R40.0 DAYTIME SLEEPINESS: ICD-10-CM

## 2025-04-02 LAB
25(OH)D3+25(OH)D2 SERPL-MCNC: 43 NG/ML (ref 30–100)
ALBUMIN SERPL-MCNC: 4.5 G/DL (ref 3.6–5.1)
ALP SERPL-CCNC: 54 U/L (ref 31–125)
ALT SERPL-CCNC: 12 U/L (ref 6–29)
ANION GAP SERPL CALCULATED.4IONS-SCNC: 7 MMOL/L (CALC) (ref 7–17)
AST SERPL-CCNC: 15 U/L (ref 10–30)
BILIRUB SERPL-MCNC: 1.6 MG/DL (ref 0.2–1.2)
BUN SERPL-MCNC: 12 MG/DL (ref 7–25)
CALCIUM SERPL-MCNC: 9.4 MG/DL (ref 8.6–10.2)
CHLORIDE SERPL-SCNC: 104 MMOL/L (ref 98–110)
CO2 SERPL-SCNC: 27 MMOL/L (ref 20–32)
CREAT SERPL-MCNC: 0.76 MG/DL (ref 0.5–0.97)
EGFRCR SERPLBLD CKD-EPI 2021: 105 ML/MIN/1.73M2
GLUCOSE SERPL-MCNC: 85 MG/DL (ref 65–99)
MAGNESIUM SERPL-MCNC: 2 MG/DL (ref 1.5–2.5)
POTASSIUM SERPL-SCNC: 4.6 MMOL/L (ref 3.5–5.3)
PROT SERPL-MCNC: 6.9 G/DL (ref 6.1–8.1)
SODIUM SERPL-SCNC: 138 MMOL/L (ref 135–146)
VIT B12 SERPL-MCNC: 559 PG/ML (ref 200–1100)

## 2025-04-03 ENCOUNTER — TELEPHONE (OUTPATIENT)
Dept: SLEEP MEDICINE | Facility: CLINIC | Age: 35
End: 2025-04-03
Payer: COMMERCIAL

## 2025-04-03 ENCOUNTER — CLINICAL SUPPORT (OUTPATIENT)
Dept: SLEEP MEDICINE | Facility: CLINIC | Age: 35
End: 2025-04-03
Payer: COMMERCIAL

## 2025-04-03 DIAGNOSIS — G47.19 EXCESSIVE DAYTIME SLEEPINESS: Primary | ICD-10-CM

## 2025-04-03 DIAGNOSIS — R17 ELEVATED BILIRUBIN: Primary | ICD-10-CM

## 2025-04-03 DIAGNOSIS — R40.0 DAYTIME SLEEPINESS: ICD-10-CM

## 2025-04-03 NOTE — PROGRESS NOTES
[facility] TECH NOTE:     Patient: Mahi Cuba   MRN//AGE: [id number]  1990  34 y.o.   Technologist: Alexi Ramirez RRT-RPSGT   Room: 108   Service Date: 2025        Sleep Testing Location: [facility]: [facility]    TECHNOLOGIST SLEEP STUDY PROCEDURE NOTE:   This sleep study is being conducted according to the policies and procedures outlined by the AAS accreditation standards.  The sleep study procedure and processes involved during this appointment was explained to the patient/patient’s family, questions were answered. The patient/family verbalized understanding.      The patient is a 34 y.o. year old female scheduled for a diagnostic PSG with limb leads.  with montage of:  diagnostic . she arrived for her appointment.      The study that was ultimately completed was a diagnostic PSG with limb leads.      The full study Was completed.  Patient questionnaires completed?: yes     Consents signed? yes    Initial Fall Risk Screening:     Mahi has not fallen in the last 6 months. Mahi does not have a fear of falling. She does not need assistance with sitting, standing, or walking. she does not need assistance walking in her home. she does not need assistance in an unfamiliar setting. The patient is notusing an assistive device.     Brief Study observations: Patient used earplugs, white noise machine and home night light to help her sleep. During REM she would have frequent arousals. No RBD or parasomnias were observed. Patient has been off all her meds for preparation for this study.     Deviation to order/protocol and reason: no      If PAP, which was preferred mask/pressure/mode: n/a      Other:None    After the procedure, the patient/family was informed to ensure followup with ordering clinician for testing results.      Technologist: Alexi Ramirez RRT

## 2025-04-03 NOTE — PROGRESS NOTES
[facility] TECH NOTE:     Patient: Mahi Cuba   MRN//AGE: [id number]  1990  34 y.o.   Technologist: Sherron Honeycutt RRT-SDS   Room: 108   Service Date: 4/3/2025        Sleep Testing Location: [place]    [place]:     TECHNOLOGIST SLEEP STUDY PROCEDURE NOTE:   This sleep study is being conducted according to the policies and procedures outlined by the AAS accreditation standards. The sleep study procedure and processes involved during this appointment was explained to the patient/patient’s family, questions were answered. The patient/family verbalized understanding.     The patient is a 34 y.o. year old female scheduled for a MSLT/MWT . she arrived for her study by 8pm.    The study that was ultimately completed was a MSLT/MWT .    The full study Was completed.  Patient questionnaires completed?: yes     Consents signed? yes    Initial Fall Risk Screening:     Mahi has not fallen in the last 6 months. her did not result in injury. Mahi does not have a fear of falling. He does not need assistance with sitting, standing, or walking. she does not need assistance walking in her home. she does not need assistance in an unfamiliar setting. The patient is notusing an assistive device.     Brief Study observations: Patient was observed sleep 4 out of 5 naps. Patient appeared to REM in naps 3 & 4.     Deviation to order/protocol and reason:       If PAP, which was preferred mask/pressure/mode:       Other:None    After the procedure, the patient/family was informed to ensure followup with ordering clinician for testing results.      Technologist: NADER Washington RRT 25 6:37 PM

## 2025-04-05 LAB
AMPHETAMINES UR QL: NEGATIVE NG/ML
BARBITURATES UR QL: NEGATIVE NG/ML
BENZODIAZ UR QL: NEGATIVE NG/ML
BZE UR QL: NEGATIVE NG/ML
CREAT UR-MCNC: 101.9 MG/DL
FENTANYL UR QL SCN: NEGATIVE NG/ML
METHADONE UR QL: NEGATIVE NG/ML
OPIATES UR QL: NEGATIVE NG/ML
OXIDANTS UR QL: NEGATIVE MCG/ML
OXYCODONE UR QL: NEGATIVE NG/ML
PCP UR QL: NEGATIVE NG/ML
PH UR: 6.8 [PH] (ref 4.5–9)
QUEST NOTES AND COMMENTS: NORMAL
THC UR QL: NEGATIVE NG/ML

## 2025-04-09 ENCOUNTER — APPOINTMENT (OUTPATIENT)
Dept: PRIMARY CARE | Facility: CLINIC | Age: 35
End: 2025-04-09
Payer: COMMERCIAL

## 2025-04-14 ENCOUNTER — APPOINTMENT (OUTPATIENT)
Dept: PRIMARY CARE | Facility: CLINIC | Age: 35
End: 2025-04-14
Payer: COMMERCIAL

## 2025-04-16 ENCOUNTER — OFFICE VISIT (OUTPATIENT)
Dept: SLEEP MEDICINE | Facility: CLINIC | Age: 35
End: 2025-04-16
Payer: COMMERCIAL

## 2025-04-16 DIAGNOSIS — G47.52 RBD (REM BEHAVIORAL DISORDER): ICD-10-CM

## 2025-04-16 DIAGNOSIS — G25.81 RLS (RESTLESS LEGS SYNDROME): ICD-10-CM

## 2025-04-16 DIAGNOSIS — G47.11 IDIOPATHIC HYPERSOMNIA: Primary | ICD-10-CM

## 2025-04-16 DIAGNOSIS — G47.21 CIRCADIAN RHYTHM SLEEP-WAKE DISORDERS, DELAYED SLEEP PHASE TYPE: ICD-10-CM

## 2025-04-16 PROCEDURE — 99214 OFFICE O/P EST MOD 30 MIN: CPT | Performed by: INTERNAL MEDICINE

## 2025-04-16 PROCEDURE — 1036F TOBACCO NON-USER: CPT | Performed by: INTERNAL MEDICINE

## 2025-04-16 PROCEDURE — 99214 OFFICE O/P EST MOD 30 MIN: CPT | Mod: 95 | Performed by: INTERNAL MEDICINE

## 2025-04-16 RX ORDER — MODAFINIL 200 MG/1
200 TABLET ORAL DAILY
Qty: 30 TABLET | Refills: 2 | Status: SHIPPED | OUTPATIENT
Start: 2025-04-16 | End: 2025-07-15

## 2025-04-16 ASSESSMENT — SLEEP AND FATIGUE QUESTIONNAIRES
HOW LIKELY ARE YOU TO NOD OFF OR FALL ASLEEP WHILE LYING DOWN TO REST IN THE AFTERNOON WHEN CIRCUMSTANCES PERMIT: HIGH CHANCE OF DOZING
HOW LIKELY ARE YOU TO NOD OFF OR FALL ASLEEP WHILE SITTING AND TALKING TO SOMEONE: SLIGHT CHANCE OF DOZING
HOW LIKELY ARE YOU TO NOD OFF OR FALL ASLEEP WHILE SITTING QUIETLY AFTER LUNCH WITHOUT ALCOHOL: HIGH CHANCE OF DOZING
HOW LIKELY ARE YOU TO NOD OFF OR FALL ASLEEP WHILE SITTING AND READING: HIGH CHANCE OF DOZING
HOW LIKELY ARE YOU TO NOD OFF OR FALL ASLEEP WHEN YOU ARE A PASSENGER IN A CAR FOR AN HOUR WITHOUT A BREAK: HIGH CHANCE OF DOZING
HOW LIKELY ARE YOU TO NOD OFF OR FALL ASLEEP WHILE WATCHING TV: SLIGHT CHANCE OF DOZING
SITING INACTIVE IN A PUBLIC PLACE LIKE A CLASS ROOM OR A MOVIE THEATER: SLIGHT CHANCE OF DOZING
HOW LIKELY ARE YOU TO NOD OFF OR FALL ASLEEP IN A CAR, WHILE STOPPED FOR A FEW MINUTES IN TRAFFIC: SLIGHT CHANCE OF DOZING
ESS-CHAD TOTAL SCORE: 16

## 2025-04-16 ASSESSMENT — COLUMBIA-SUICIDE SEVERITY RATING SCALE - C-SSRS
6. HAVE YOU EVER DONE ANYTHING, STARTED TO DO ANYTHING, OR PREPARED TO DO ANYTHING TO END YOUR LIFE?: NO
2. HAVE YOU ACTUALLY HAD ANY THOUGHTS OF KILLING YOURSELF?: NO
1. IN THE PAST MONTH, HAVE YOU WISHED YOU WERE DEAD OR WISHED YOU COULD GO TO SLEEP AND NOT WAKE UP?: NO

## 2025-04-16 ASSESSMENT — ENCOUNTER SYMPTOMS: DEPRESSION: 0

## 2025-04-16 ASSESSMENT — PAIN SCALES - GENERAL: PAINLEVEL_OUTOF10: 4

## 2025-04-16 NOTE — PROGRESS NOTES
[facility] SLEEP MEDICINE   FOLLOW-UP VISIT NOTE    Virtual or Telephone Consent  An interactive audio and video telecommunication system which permits real time communications between the patient (at the originating site) and provider (at the distant site) was utilized to provide this telehealth service.   The patient was informed about the telehealth clinical encounter including benefits to avoiding travel, limitations of the assessment, and billing for the service. In-office care may be recommended if needed. Telehealth sessions are not being recorded and personal health information is protected. All questions were answered and verbal consent from the patient (or guardian) was obtained to proceed.     PCP: CHAY Cummins    HISTORY OF PRESENT ILLNESS     Patient ID: Mahi Cuba is a 34 y.o. female who presents for follow-up after sleep study    Patient is here alone today.  To review, patient's medical history is notable for ***     Interval History  Patient was last seen in ***. Plan was to {EWpreviousvisitplans:48726}.  {Interval sleep history:74536}    RLS Follow-up:   ***    SLEEP STUDY HISTORY (personally reviewed raw data such as interpretation report, data sheet, hypnogram, and titration table if available and applicable)  ***      SLEEP-WAKE SCHEDULE  Bedtime: {bedtime and waketime:84870}  Subjective sleep latency: ***  Difficulty falling asleep: {Yes/No:08307}  Number of awakenings: {EWnocturnalwakin}  Nocturia: {Yes/No:22966}  Falls back asleep right away  Final wake time: {bedtime and waketime:37008}  Out of bed time: {bedtime and waketime:63556}  Shift work: {Yes/No:35216}   Naps: ***  Feels rested after a nap: {Yes/No:61573}   Average sleep duration (excluding naps): ***    SLEEP ENVIRONMENT  Sleep location: ***  Sleep status: {sleep status:59945}  Preferred sleep position: {Sleep position:53648}    SOCIAL HISTORY  Smoking: ***  ETOH: ***  Marijuana: ***  Caffeine:  ***  Sleep aids: ***    WEIGHT: {weight:27519}    Claustrophobia: {Yes/No:97971}     REVIEW OF SYSTEMS     All other systems have been reviewed and are negative.    ALLERGIES     Allergies[1]    MEDICATIONS     Current Medications[2]    Active Problems, Allergy List, Medication List, and PMH/PSH/FH/Social Hx have been reviewed and reconciled in chart. No significant changes unless documented in the pertinent chart section. Updates made when necessary.       PHYSICAL EXAM     VITAL SIGNS: There were no vitals taken for this visit.    Today ESS: 16  Last visit ESS: ***  Last visit RACHEL: ***    Physical Exam  Constitutional: Awake, not in distress  Head: Normocephalic, atraumatic  Skin: Warm, no rash  Neuro: No tremors, moves all extremities  Psych: alert and oriented to time, place, and person    RESULTS/DATA     Iron (ug/dL)   Date Value   12/05/2024 135   09/28/2024 68     % Saturation (%)   Date Value   12/05/2024 34   09/28/2024 17 (L)     TIBC (ug/dL)   Date Value   12/05/2024 401   09/28/2024 406     Ferritin (ng/mL)   Date Value   12/05/2024 85   09/28/2024 55       CARBON DIOXIDE   Date Value Ref Range Status   04/01/2025 27 20 - 32 mmol/L Final       ASSESSMENT/PLAN     Mahi Cuba is a 34 y.o. female who returns in [facility] for the following problems:    OBSTRUCTIVE SLEEP APNEA, *** positional ({EWssbaseline:23425} {EWrespiindices:45889}  SLEEP-RELATED HYPOXEMIA  {EWPLANFOROSAEVAL:75823}   {EWsplit&titratecomments:22282}    OBSTRUCTIVE SLEEP APNEA, *** positional ({EWssbaseline:41777} {EWrespiindices:73289}  SLEEP-RELATED HYPOXEMIA  - Personally reviewed the sleep study's raw data such as interpretation report, data sheet, and hypnogram. Discussed sleep study results with patient today. A copy of recent testing was either given to patient or released in [employer]. See HPI for detailed summary of sleep study results.  - Recommend starting auto-CPAP *** cm H2O with EPR ***,  heated humidification, heated tubings, and mask used in sleep lab (***). Orders sent to {DME:28014}.  - Recommend starting auto-CPAP *** cm H2O with EPR ***, heated humidification, heated tubings, and mask fitting session. Patient is interested in trying the following mask: ***. Orders sent to {DME:71520}.  - Discussed 30-day mask guarantee and insurance requirement regarding PAP compliance and follow-up.   - Advised about cleaning instructions and replacement schedule of PAP accessories.  - Supportive management as follows: Lose weight. Stay off your back when sleeping. Avoid smoking, alcohol, and sedating medications if applicable. Don't drive when sleepy.    SEASONAL ALLERGIES / ALLERGIC RHINITIS / CHRONIC NASAL CONGESTION / POST-NASAL DRIP  - Start fluticasone nasal spray 2 sprays per nostril once daily 1 hour before bedtime.  - If there is inadequate or lack of improvement on the above intranasal steroid spray, consider adding Azelastine nasal spray, 2 sprays per nostril once daily in the morning.   - Alternatively, may add cetirizine or loratadine 10 mg once daily.    OBESITY / MORBID OBESITY  - Recommend weight loss with diet and exercise.  - Weight loss can help in long term management of BARBARA.  - Defer management to PCP.  - Will do preop risk evaluation once patient comes back with good PAP compliance.    HYPERTENSION  - BP stable today.  - BP slightly high today. Denies chest discomfort, shortness of breath, palpitations, headache, blurred vision, dizziness, or neurologic deficit.  - Untreated  or inadequately treated sleep apnea can lead to new onset hypertension, resistant hypertension, or refractory hypertension.  - Continue anti-hypertensive medications per PCP.  - Supportive management: low salt DASH diet (less than 2000 mg sodium intake daily), moderate intensity aerobic exercise at least 30 minutes 5 days per week, reduce stress, quit smoking, limit alcohol, lose weight, and monitor BP once daily  -  PCP following. Defer management to PCP.    ATRIAL FIBRILLATION, ***  - currently in sinus rhythm / rate-controlled***  - Continue meds per Cardio.  - Cardio following. Defer management to Cardio.    {EWFOLLOW-UP:71036}      All of patient's questions were answered. She verbalizes understanding and agreement with my assessment and plan. Refer to after-visit-summary (AVS) for patient education and if applicable, for more details on troubleshooting issues with PAP usage, proper cleaning instructions of PAP supplies, and usual recommended replacement schedule for each of the PAP supplies.              [1]  No Known Allergies  [2]  Current Outpatient Medications   Medication Sig Dispense Refill   • ibuprofen (Motrin) capsule Take 2 capsules (400 mg) by mouth once daily as needed.     • azelastine (Astelin) 137 mcg (0.1 %) nasal spray Administer 2 sprays into each nostril once daily. Shake gently then remove cap and point spray tip sideways toward your ears before applying. After use, clean tip. (Patient not taking: Reported on 4/16/2025) 30 mL 12   • cholecalciferol, vitamin D3, 325 mcg (13,000 unit) capsule Take by mouth. (Patient not taking: Reported on 4/16/2025)     • FLUoxetine (PROzac) 40 mg capsule Take 1 capsule (40 mg) by mouth early in the morning.. (Patient not taking: Reported on 4/16/2025)     • QUEtiapine (SEROquel) 25 mg tablet Take 1 tablet (25 mg) by mouth once daily at bedtime. 1/2 to 1 tab nightly (Patient not taking: Reported on 4/16/2025)     • triamcinolone (Nasacort) 55 mcg nasal inhaler Administer 2 sprays into each nostril once daily. (Patient not taking: Reported on 4/16/2025)     • vit B complex no.12/niacin,B3, (VITAMIN B COMPLEX NO.12-NIACIN ORAL) Take by mouth. (Patient not taking: Reported on 4/16/2025)       No current facility-administered medications for this visit.        DELAYED PHASE SLEEP-WAKE DISORDER  - Consider chronotherapy, light therapy, and melatonin     SEASONAL ALLERGIES   - Continue fluticasone nasal spray 2 sprays per nostril once daily 1 hour before bedtime.  - Continue Azelastine nasal spray, 2 sprays per nostril once daily in the morning.     Follow-up in 1-2 months after med recheck.      All of patient's questions were answered. She verbalizes understanding and agreement with my assessment and plan. Refer to after-visit-summary (AVS) for patient education and if applicable, for more details on troubleshooting issues with PAP usage, proper cleaning instructions of PAP supplies, and usual recommended replacement schedule for each of the PAP supplies.            [1] No Known Allergies  [2]   Current Outpatient Medications   Medication Sig Dispense Refill    ibuprofen (Motrin) capsule Take 2 capsules (400 mg) by mouth once daily as needed.      azelastine (Astelin) 137 mcg (0.1 %) nasal spray Administer 2 sprays into each nostril once daily. Shake gently then remove cap and point spray tip sideways toward your ears before applying. After use, clean tip. (Patient not taking: Reported on 4/16/2025) 30 mL 12    cholecalciferol, vitamin D3, 325 mcg (13,000 unit) capsule Take by mouth. (Patient not taking: Reported on 4/16/2025)      FLUoxetine (PROzac) 40 mg capsule Take 1 capsule (40 mg) by mouth early in the morning.. (Patient not taking: Reported on 4/16/2025)      QUEtiapine (SEROquel) 25 mg tablet Take 1 tablet (25 mg) by mouth once daily at bedtime. 1/2 to 1 tab nightly (Patient not taking: Reported on 4/16/2025)      triamcinolone (Nasacort) 55 mcg nasal inhaler Administer 2 sprays into each nostril once daily. (Patient not taking: Reported on 4/16/2025)      vit B complex no.12/niacin,B3, (VITAMIN B COMPLEX NO.12-NIACIN ORAL) Take by mouth. (Patient not taking: Reported on 4/16/2025)       No current facility-administered medications for this visit.

## 2025-04-21 DIAGNOSIS — R17 ELEVATED BILIRUBIN: ICD-10-CM

## 2025-04-29 ENCOUNTER — TELEPHONE (OUTPATIENT)
Dept: SLEEP MEDICINE | Facility: CLINIC | Age: 35
End: 2025-04-29
Payer: COMMERCIAL

## 2025-04-29 PROBLEM — G47.52 RBD (REM BEHAVIORAL DISORDER): Status: ACTIVE | Noted: 2025-04-29

## 2025-04-29 PROBLEM — G47.21: Status: ACTIVE | Noted: 2025-04-29

## 2025-04-29 PROBLEM — G25.81 RLS (RESTLESS LEGS SYNDROME): Status: ACTIVE | Noted: 2025-04-29

## 2025-04-29 PROBLEM — G47.11 IDIOPATHIC HYPERSOMNIA: Status: ACTIVE | Noted: 2025-04-29

## 2025-04-29 NOTE — TELEPHONE ENCOUNTER
Patient called and said she was has been having issues with sleeping, she has been using THC gummies per her anxiety to try to help but it is not helping her. She said she spoke with her other provider about the clonazepam and they said they would be okay with her being on the benzo.    Patient is wondering if she can have it ordered and to take that along with taking the modafinil in the mornings.    She said she does not want to keep taking the THC gummies. She stated her anxiety was high enough that she took 40mg THC and she would rather be on something that is regulated and watched.    [phone]   Pt's right palm dressed with vaseline gauze and kerlix, teach back done and verbalized understanding. Pt provided supplies for home use.

## 2025-05-14 ENCOUNTER — APPOINTMENT (OUTPATIENT)
Dept: SLEEP MEDICINE | Facility: CLINIC | Age: 35
End: 2025-05-14
Payer: COMMERCIAL

## 2025-05-16 ENCOUNTER — TELEPHONE (OUTPATIENT)
Dept: SLEEP MEDICINE | Facility: HOSPITAL | Age: 35
End: 2025-05-16

## 2025-05-16 ENCOUNTER — OFFICE VISIT (OUTPATIENT)
Dept: SLEEP MEDICINE | Facility: CLINIC | Age: 35
End: 2025-05-16
Payer: COMMERCIAL

## 2025-05-16 DIAGNOSIS — G47.11 IDIOPATHIC HYPERSOMNIA: ICD-10-CM

## 2025-05-16 DIAGNOSIS — Z79.899 MEDICATION MANAGEMENT: Primary | ICD-10-CM

## 2025-05-16 PROCEDURE — 99214 OFFICE O/P EST MOD 30 MIN: CPT | Performed by: INTERNAL MEDICINE

## 2025-05-16 PROCEDURE — 99214 OFFICE O/P EST MOD 30 MIN: CPT | Mod: 95 | Performed by: INTERNAL MEDICINE

## 2025-05-16 RX ORDER — PANTOPRAZOLE SODIUM 20 MG/1
20 TABLET, DELAYED RELEASE ORAL
COMMUNITY

## 2025-05-16 ASSESSMENT — SLEEP AND FATIGUE QUESTIONNAIRES
HOW LIKELY ARE YOU TO NOD OFF OR FALL ASLEEP WHEN YOU ARE A PASSENGER IN A CAR FOR AN HOUR WITHOUT A BREAK: MODERATE CHANCE OF DOZING
ESS-CHAD TOTAL SCORE: 12
HOW LIKELY ARE YOU TO NOD OFF OR FALL ASLEEP WHILE SITTING AND TALKING TO SOMEONE: SLIGHT CHANCE OF DOZING
HOW LIKELY ARE YOU TO NOD OFF OR FALL ASLEEP WHILE SITTING QUIETLY AFTER LUNCH WITHOUT ALCOHOL: SLIGHT CHANCE OF DOZING
HOW LIKELY ARE YOU TO NOD OFF OR FALL ASLEEP WHILE LYING DOWN TO REST IN THE AFTERNOON WHEN CIRCUMSTANCES PERMIT: MODERATE CHANCE OF DOZING
HOW LIKELY ARE YOU TO NOD OFF OR FALL ASLEEP IN A CAR, WHILE STOPPED FOR A FEW MINUTES IN TRAFFIC: WOULD NEVER DOZE
HOW LIKELY ARE YOU TO NOD OFF OR FALL ASLEEP WHILE SITTING AND READING: MODERATE CHANCE OF DOZING
SITING INACTIVE IN A PUBLIC PLACE LIKE A CLASS ROOM OR A MOVIE THEATER: SLIGHT CHANCE OF DOZING
HOW LIKELY ARE YOU TO NOD OFF OR FALL ASLEEP WHILE WATCHING TV: HIGH CHANCE OF DOZING

## 2025-05-16 ASSESSMENT — ENCOUNTER SYMPTOMS: DEPRESSION: 0

## 2025-05-16 ASSESSMENT — PAIN SCALES - GENERAL: PAINLEVEL_OUTOF10: 0-NO PAIN

## 2025-05-16 NOTE — PROGRESS NOTES
[facility] SLEEP MEDICINE CLINIC  FOLLOW-UP VISIT NOTE      Virtual or Telephone Consent  An interactive audio and video telecommunication system which permits real time communications between the patient (at the originating site) and provider (at the distant site) was utilized to provide this telehealth service.   The patient was informed about the telehealth clinical encounter including benefits to avoiding travel, limitations of the assessment, and billing for the service. In-office care may be recommended if needed. Telehealth sessions are not being recorded and personal health information is protected. All questions were answered and verbal consent from the patient (or guardian) was obtained to proceed.       HISTORY OF PRESENT ILLNESS     CONTROLLED SUBSTANCE HPI  Current medications: modafinil  Any side effects: from current meds: Yes (extreme irritability, bugs under skin, emotional lability, waking up panicking in the middle of sleep, and insomnia)    OARRS:   I have personally reviewed the OARRS report for Mahi Cuba. I have considered the risks of abuse, dependence, addiction and diversion and I believe that it is clinically appropriate for Mahi Cuba to be prescribed this medication    Is the patient prescribed a combination of benzodiazepine and opioid? No    Date of last urine drug screen: 4/3/2025   Results of last urine drug screen: Results are as expected.   Ordered urine drug screen today: No     Controlled substance agreement:   Date of the last agreement: 4/17/2025  Renewed controlled substance agreement today: Yes but using different category  Reviewed Controlled Substance Agreement including but not limited to the benefits, risks, and alternatives to treatment with a Controlled Substance medication(s).    Patient ID: Mahi Cuba is a 34 y.o. female who presents to a [facility] for follow-up of idiopathic hypersomia on modafinil.     Patient  is here alone today.  To review, patient's medical history is notable for seasonal allergies, RLS, RBD, nightmare disorder, and idiopathic hypersomnia     Interval History  Patient was last seen in 4/16/2025. Plan was to start modafinil  Since last visit, miley still complains of sleep inertia. Also complains medication side effects such as extreme irritability, waking up panicking (racing heart and feeling anxious) in the middle of sleep, bugs under skin, emotional lability, and insomnia. After stopping modafinil, irritability had resolved. After doing the PSG-MSLT, patient did not resume Fluoxetine and she does not get nightmares anymore. However, she sometimes have dreams of snakes in bed and would wake up panicking. Patient reports that her sleep-onset insomnia did not improve even after stopping modafinil. Patient states that even if she did not drink caffeine and had a lot of yardwork, she still cannot fall asleep at bedtime; hence, she is now taking THC gummies as needed. On the other hand, she reports that sleep-maintenance insomnia had resolved after stopping fluoxetine and modafinil.     RLS Follow-up:   Still have RLS 2x per week but not bothersome. Patient thinks it is related to anxiety     SLEEP STUDY HISTORY (personally reviewed raw data such as interpretation report, data sheet, hypnogram, and titration table if available and applicable)  PSG 12/9/2024: RDI3% 3.8 (Supine RDI3% 6.2, non-supine RDI3% 2), RDI4% 0.7 (Supine RDI4% 1.2, non-supine RDI4% 0.3), SpO2 emilie 94%, sleep efficiency 85.2%sleep latency 21 mins, REM latency 127.5 mins (med list showed fluoxetine and seroquel)   PSG 4/2/2025: RDI3% 4.8 (Supine RDI3% 5.9, non-supine RDI3% 1.3), RDI4% 1.5 (supine RDI4% 1.8, non-supine RDI4% 0.6), SpO2 emilie 92%, sleep latency 106 mins, REM latency 24.5 mins, sleep efficiency 75.2%, TST 6.6 hours, N1 6.1%, N2 50.2%, N3 12.2%, REM 31.5%, arousal index 13.6 (mostly spontaneous), PLM index 0  MSLT  4/3/2025: MSL 9.9 mins, no SOREMP. Med list showed no meds. UDS was neg. Sleep log showed average sleep duration of 8.3 hours (range 7-10 hours) with average bedtime at 2 AM (range 1-4 AM), average sleep onset of 38 minutes, sleeps thru the night every night, and average final wake time of 10:30 AM to 11 AM (range 9 AM to 12 NN). Delayed sleep-wake disorder and sleep onset insomnia was noted as well as make-up sleep during off-work. 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 At night, she reports waking up at night but cannot recall how many times and at what time.    SLEEP-WAKE SCHEDULE  Bedtime: 11 PM to 1 AM daily  Subjective sleep latency: 30-60 minutes  Difficulty falling asleep: yes due to effect of modafinil   Number of awakenings: none. Patient sleeps thru the night for 5-6 nights per week. Other nights especially when she was on modafinil, she would wake up 4-5 times randomly for unknown reasons  Falls back asleep right away  Final wake time: 9:30 AM to 10:30 AM daily  Out of bed time: 9:30 AM to 10:30 AM daily  Shift work: no  Naps: No. Patient states that she only takes naps 1 week before her menstruation and the nap usually occurs 2 days per week for 2 hours each nap.  Feels rested after a nap: No but she feels less crappy, less achy, and overall feels better after a nap then before she lay down for nap.  Average sleep duration (excluding naps): 6-9 hours    SLEEP ENVIRONMENT  Sleep location: bed  Sleep status: sleeps with   Preferred sleep position: side    SLEEP HABITS   Smoking: no  ETOH: 1 bottle wine once a month  Marijuana: yes. Patient states that   Caffeine: 175 to 200 mg 3 times per week (only when going out to drive somewhere)  Sleep aids: yes on Seroquel    WEIGHT: stable    Claustrophobia: Yes    REVIEW OF SYSTEMS     All other systems have  been reviewed and are negative.    ALLERGIES     Allergies[1]    MEDICATIONS     Current Medications[2]    Active Problems, Allergy List, Medication List, and PMH/PSH/FH/Social Hx have been reviewed and reconciled in chart. No significant changes unless documented in the pertinent chart section. Updates made when necessary.     PHYSICAL EXAM     VITAL SIGNS: There were no vitals taken for this visit.    Today ESS: 12   Last visit ESS: 16  RACHEL: 20      RESULTS/DATA     Iron (ug/dL)   Date Value   12/05/2024 135   09/28/2024 68     % Saturation (%)   Date Value   12/05/2024 34   09/28/2024 17 (L)     TIBC (ug/dL)   Date Value   12/05/2024 401   09/28/2024 406     Ferritin (ng/mL)   Date Value   12/05/2024 85   09/28/2024 55       CARBON DIOXIDE   Date Value Ref Range Status   04/01/2025 27 20 - 32 mmol/L Final       ASSESSMENT/PLAN     Mahi Cuba is a 34 y.o. female who returns in [facility] for the following problems:    IDIOPATHIC HYPERSOMNIA  - Tried modafinil but developed a lot of side effects such as extreme irritability, bugs under skin, emotional lability, waking up panicking in the middle of sleep, and insomnia  - OARRS reviewed today which showed no signs of abuse.  - Controlled substance agreement initiated and signed today in clinic.  - Urine drug screen completed last 4/3/2025 which showed results as expected. Urine drug screen ordered today.  - Agree with stopping modafinil. Start Xywav. Discussed side effects.   - Supportive management:   Plan naps to control daytime sleepiness and reduce the number of unplanned sudden sleep attacks. Schedule a brief nap (10-15 minutes) in strategic times during the day, if possible.   Inform teachers or work supervisors about the condition.   Maintain a regular sleep-wake schedule and practice good sleep hygiene.   Expose self to bright light in daytime.   Exercise daily in daytime as it can beneficial and STIMULATING.   Avoid  alcohol and sedative-hypnotic drugs as these substances may exacerbate sleepiness.   Avoid driving vehicle or operating heavy machinery when sleepy until daytime sleepiness is well-treated. A person driving while sleepy is 5 times more likely to have an accident. If you feel sleepy, pull over and take a short power nap (sleep for less than 30 minutes). Otherwise, ask somebody to drive you.     BORDERLINE OBSTRUCTIVE SLEEP APNEA, positional (PSG RDI3% 4.8, RDI4% 1.5)  - Reviewed the 2 PSGs done and they both consistently showed mild BARBARA only on supine sleep. However, overall RDI3% or RDI4% was less than 5.0; hence, I am not able to prescribe any treatment.  - Highly recommend strict positional therapy.  - Supportive management as follows: Stay off your back when sleeping. Avoid smoking, alcohol, and sedating medications if applicable. Don't drive when sleepy.    RESTLESS LEG SYNDROME  - 12/5/2024 ferritin 85, TSAT 34%  - Per patient, she gets RLS symptoms 2x per week but they are not bothersome. If RLS symptoms worsen, consider iron infusion and/or gabapentin.  - Supportive management: Avoid triggers of RLS such as caffeine, alcohol, nicotine, medications, and low iron. Taper off or reduce dose of medications that can cause RLS such as but not limited ot antidepressants except Buproprion and Trazodone, 1st generation antihistamines, antipsychotics, anti-emetics except ondansetron, melatonin, topiramate, etc. Consider switch antidepressants to Bupropion if feasible. May take warm bath 2 hours before bedtime. Massage and/or stretch legs while in bed     REM SLEEP BEHAVIOR DISORDER  - Used to karate-chop her  in bed while sleeping  - Now dreams of snakes in bed  - Discussed safety precautions of sleeping environment.   - Consider taking melatonin 10 mg 1-2 hours before bedtime.   - If patient is taking Xywav, hold off on melatonin.     DELAYED PHASE SLEEP-WAKE DISORDER  - Consider chronotherapy, light therapy,  and melatonin     SEASONAL ALLERGIES   - Continue fluticasone nasal spray 2 sprays per nostril once daily 1 hour before bedtime.  - Continue Azelastine nasal spray, 2 sprays per nostril once daily in the morning.     All of patient's questions were answered. She verbalizes understanding and agreement with my assessment and plan. Refer to after-visit-summary (AVS) for patient education and if applicable, for more details on troubleshooting issues with PAP usage, proper cleaning instructions of PAP supplies, and usual recommended replacement schedule for each of the PAP supplies.     Follow-up in 1-2 months after med recheck.         [1] No Known Allergies  [2]   Current Outpatient Medications   Medication Sig Dispense Refill    ibuprofen (Motrin) capsule Take 2 capsules (400 mg) by mouth once daily as needed.      pantoprazole (ProtoNix) 20 mg EC tablet Take 1 tablet (20 mg) by mouth once daily in the morning. Take before meals. Do not crush, chew, or split.      azelastine (Astelin) 137 mcg (0.1 %) nasal spray Administer 2 sprays into each nostril once daily. Shake gently then remove cap and point spray tip sideways toward your ears before applying. After use, clean tip. (Patient not taking: Reported on 4/16/2025) 30 mL 12    cholecalciferol, vitamin D3, 325 mcg (13,000 unit) capsule Take by mouth. (Patient not taking: Reported on 4/16/2025)      modafinil (Provigil) 200 mg tablet Take 1 tablet (200 mg) by mouth once daily. (Patient not taking: Reported on 5/16/2025) 30 tablet 2    triamcinolone (Nasacort) 55 mcg nasal inhaler Administer 2 sprays into each nostril once daily. (Patient not taking: Reported on 4/16/2025)      vit B complex no.12/niacin,B3, (VITAMIN B COMPLEX NO.12-NIACIN ORAL) Take by mouth. (Patient not taking: Reported on 4/16/2025)       No current facility-administered medications for this visit.

## 2025-05-19 ENCOUNTER — APPOINTMENT (OUTPATIENT)
Dept: ORTHOPEDIC SURGERY | Facility: CLINIC | Age: 35
End: 2025-05-19
Payer: COMMERCIAL

## 2025-05-20 LAB
ALBUMIN SERPL-MCNC: 4.3 G/DL (ref 3.6–5.1)
ALBUMIN/GLOB SERPL: 1.7 (CALC) (ref 1–2.5)
ALP SERPL-CCNC: 62 U/L (ref 31–125)
ALT SERPL-CCNC: 13 U/L (ref 6–29)
AST SERPL-CCNC: 16 U/L (ref 10–30)
BILIRUB DIRECT SERPL-MCNC: 0.2 MG/DL
BILIRUB INDIRECT SERPL-MCNC: 0.6 MG/DL (CALC) (ref 0.2–1.2)
BILIRUB SERPL-MCNC: 0.8 MG/DL (ref 0.2–1.2)
GLOBULIN SER CALC-MCNC: 2.6 G/DL (CALC) (ref 1.9–3.7)
PROT SERPL-MCNC: 6.9 G/DL (ref 6.1–8.1)

## 2025-05-21 LAB
AMPHETAMINES UR QL: NEGATIVE NG/ML
BARBITURATES UR QL: NEGATIVE NG/ML
BENZODIAZ UR QL: NEGATIVE NG/ML
BZE UR QL: NEGATIVE NG/ML
CREAT UR-MCNC: 104.9 MG/DL
DRUG SCREEN COMMENT UR-IMP: ABNORMAL
FENTANYL UR QL SCN: NEGATIVE NG/ML
METHADONE UR QL: NEGATIVE NG/ML
OPIATES UR QL: NEGATIVE NG/ML
OXIDANTS UR QL: NEGATIVE MCG/ML
OXYCODONE UR QL: NEGATIVE NG/ML
PCP UR QL: NEGATIVE NG/ML
PH UR: 5 [PH] (ref 4.5–9)
QUEST NOTES AND COMMENTS: ABNORMAL
THC UR QL: POSITIVE NG/ML
THC UR-MCNC: 486 NG/ML

## 2025-05-21 RX ORDER — (CALCIUM, MAGNESIUM, POTASSIUM, AND SODIUM OXYBATES) .5; .5; .5; .5 G/ML; G/ML; G/ML; G/ML
SOLUTION ORAL
COMMUNITY

## 2025-05-21 NOTE — TELEPHONE ENCOUNTER
Received request for prior auth from [facility] for Xywax. Submitted via [employer] Key: [id number]

## 2025-05-22 ENCOUNTER — OFFICE VISIT (OUTPATIENT)
Dept: ORTHOPEDIC SURGERY | Facility: CLINIC | Age: 35
End: 2025-05-22
Payer: COMMERCIAL

## 2025-05-22 DIAGNOSIS — G56.03 BILATERAL CARPAL TUNNEL SYNDROME: Primary | ICD-10-CM

## 2025-05-22 PROCEDURE — 99213 OFFICE O/P EST LOW 20 MIN: CPT | Performed by: ORTHOPAEDIC SURGERY

## 2025-05-22 PROCEDURE — 1036F TOBACCO NON-USER: CPT | Performed by: ORTHOPAEDIC SURGERY

## 2025-05-22 RX ORDER — ALBUTEROL SULFATE AND BUDESONIDE 90; 80 UG/1; UG/1
AEROSOL, METERED RESPIRATORY (INHALATION)
COMMUNITY
Start: 2024-11-09

## 2025-05-22 RX ORDER — DICYCLOMINE HYDROCHLORIDE 10 MG/1
CAPSULE ORAL
COMMUNITY
Start: 2025-05-06

## 2025-05-22 RX ORDER — CLONIDINE HYDROCHLORIDE 0.1 MG/1
TABLET ORAL
COMMUNITY
Start: 2025-05-09

## 2025-05-22 ASSESSMENT — PAIN SCALES - GENERAL: PAINLEVEL_OUTOF10: 1

## 2025-05-22 ASSESSMENT — PAIN - FUNCTIONAL ASSESSMENT: PAIN_FUNCTIONAL_ASSESSMENT: 0-10

## 2025-05-22 NOTE — PROGRESS NOTES
History of Present Illness:  Chief Complaint   Patient presents with    Left Wrist - Follow-up     CTS    Right Wrist - Follow-up     CTS     Patient previously evaluated for bilateral carpal tunnel syndrome.  She underwent right carpal tunnel injection in November 2024 with significant relief of her symptoms.  Over the last couple months she has noticed some increasing symptoms, although somewhat better today.  She feels that symptoms are definitely activity dependent and she has been somewhat less active recently due to other health issues.    Past Medical History:   Diagnosis Date    Alcohol intoxication 02/13/2024    Allergic rhinitis     Anemia     Drug withdrawal syndrome (Multi) 10/21/2017    Myofascial pain 03/27/2017       Medication Documentation Review Audit       Reviewed by Joana Irizarry CMA (Medical Assistant) on 05/22/25 at 1203      Medication Order Taking? Sig Documenting Provider Last Dose Status   azelastine (Astelin) 137 mcg (0.1 %) nasal spray [id number]  Administer 2 sprays into each nostril once daily. Shake gently then remove cap and point spray tip sideways toward your ears before applying. After use, clean tip.   Patient not taking: Reported on 4/16/2025    Salima Philip MD  Active   cholecalciferol, vitamin D3, 325 mcg (13,000 unit) capsule [id number]  Take by mouth.   Patient not taking: Reported on 4/16/2025    Historical Provider, MD  Active   ibuprofen (Motrin) capsule [id number]  Take 2 capsules (400 mg) by mouth once daily as needed. Historical Provider, MD  Active   modafinil (Provigil) 200 mg tablet [id number]  Take 1 tablet (200 mg) by mouth once daily.   Patient not taking: Reported on 5/16/2025    Salima Philip MD  Active   pantoprazole (ProtoNix) 20 mg EC tablet [id number]  Take 1 tablet (20 mg) by mouth once daily in the morning. Take before meals. Do not crush, chew, or split. Historical Provider, MD  Active   sodium,calcium,mag,pot oxybate (Xywav) 0.5 gram/mL solution  [id number]  TAKE AS DIRECTED.   Starting dosage is 4.5 g per night orally, divided into 2 doses: 2.25 g taken at bedtime and 2.25 g taken 2.5 to 4 hours later for 7 days. Increase the dosage by 1.5 g per night per week to the recommended dosage range of 6 g to 9 g per night. The dosage may be gradually titrated based on efficacy and tolerability. Salima Philip MD  Active   triamcinolone (Nasacort) 55 mcg nasal inhaler [id number]  Administer 2 sprays into each nostril once daily.   Patient not taking: Reported on 4/16/2025    Historical Provider, MD  Active   vit B complex no.12/niacin,B3, (VITAMIN B COMPLEX NO.12-NIACIN ORAL) [id number]  Take by mouth.   Patient not taking: Reported on 4/16/2025    Historical Provider, MD  Active                    No Known Allergies    Social History     Socioeconomic History    Marital status:      Spouse name: NIKKI OCHOA    Number of children: Not on file    Years of education: Not on file    Highest education level: Not on file   Occupational History    Not on file   Tobacco Use    Smoking status: Never    Smokeless tobacco: Never   Vaping Use    Vaping status: Never Used   Substance and Sexual Activity    Alcohol use: Yes     Comment: SOCIALLY    Drug use: Never    Sexual activity: Yes     Partners: Male     Birth control/protection: Condom Male   Other Topics Concern    Not on file   Social History Narrative    Not on file     Social Drivers of Health     Financial Resource Strain: Not on file   Food Insecurity: Not on file   Transportation Needs: Not on file   Physical Activity: Not on file   Stress: Not on file   Social Connections: Not on file   Intimate Partner Violence: Not on file   Housing Stability: Not on file       Past Surgical History:   Procedure Laterality Date    HERNIA REPAIR  2015    MISENTARY HERNIA    ORTHODONTIC TREATMENT            Review of Systems   GENERAL: Negative for malaise, significant weight loss, fever  MUSCULOSKELETAL: see HPI  NEURO:  see  HPI     Physical Examination:  Constitutional: Appears well-developed and well-nourished.  Head: Normocephalic and atraumatic.  Eyes: EOMI grossly  Cardiovascular: Intact distal pulses.   Respiratory: Effort normal. No respiratory distress.  Neurologic: Alert and oriented to person, place, and time.  Skin: Skin is warm and dry.  Hematologic / Lymphatic: No lymphedema, lymphangitis.  Psychiatric: normal mood and affect. Behavior is normal.   Musculoskeletal:  bilateral wrist/hand:  Previous abrasions have healed  No obvious swelling or masses  No thenar atrophy  No atrophy noted of hypothenar eminence   Positive Tinel's at carpal tunnel  + Median nerve compression test  + Beverly's test  Sensation grossly intact throughout  5/5 thumb Abduction, 5/5 Finger Abduction  Radial pulse palpable  Good capillary refill     Assessment:  Patient with bilateral carpal tunnel symptoms but with negative EMG, responded well to previous right carpal tunnel corticosteroid injection     Plan:  We once again reviewed diagnosis as well as EMG findings and the fact that good improvement following corticosteroid injection predicts similar improvement following carpal tunnel surgery.  We thoroughly reviewed risks and benefits of continued nonoperative versus operative treatment options.  After discussing patient prefers to continue with nonoperative treatment at this time.  She will follow-up if persistent/worsening symptoms and she wishes to pursue additional treatment options.  We did discuss importance of close monitoring of symptoms if they do not progress to the point where she is having constant symptoms which may indicate some degree of permanent nerve dysfunction.

## 2025-05-23 ENCOUNTER — APPOINTMENT (OUTPATIENT)
Dept: SLEEP MEDICINE | Facility: CLINIC | Age: 35
End: 2025-05-23
Payer: COMMERCIAL

## 2025-05-27 ENCOUNTER — TELEPHONE (OUTPATIENT)
Dept: SLEEP MEDICINE | Facility: HOSPITAL | Age: 35
End: 2025-05-27
Payer: COMMERCIAL

## 2025-05-27 NOTE — TELEPHONE ENCOUNTER
[facility] Called, checking to see if provider is aware that pt is using     Xanax, pt has Asthma, occasional marijuana use, hx suicidal thoughts. All approved.     6 hour gap for pt with marijuana use is recommended

## 2025-05-31 ENCOUNTER — HOSPITAL ENCOUNTER (EMERGENCY)
Facility: HOSPITAL | Age: 35
Discharge: HOME | End: 2025-06-01
Payer: COMMERCIAL

## 2025-05-31 DIAGNOSIS — Z13.9 ENCOUNTER FOR MEDICAL SCREENING EXAMINATION: Primary | ICD-10-CM

## 2025-05-31 PROCEDURE — 99284 EMERGENCY DEPT VISIT MOD MDM: CPT

## 2025-06-01 VITALS
RESPIRATION RATE: 19 BRPM | WEIGHT: 154.5 LBS | HEART RATE: 61 BPM | TEMPERATURE: 97.8 F | SYSTOLIC BLOOD PRESSURE: 90 MMHG | DIASTOLIC BLOOD PRESSURE: 53 MMHG | OXYGEN SATURATION: 97 % | BODY MASS INDEX: 25.74 KG/M2 | HEIGHT: 65 IN

## 2025-06-01 LAB
AMPHETAMINES UR QL SCN: ABNORMAL
ANION GAP SERPL CALCULATED.3IONS-SCNC: 12 MMOL/L (ref 10–20)
APAP SERPL-MCNC: <10 UG/ML (ref ?–30)
APPEARANCE UR: CLEAR
BARBITURATES UR QL SCN: ABNORMAL
BASOPHILS # BLD AUTO: 0.03 X10*3/UL (ref 0–0.1)
BASOPHILS NFR BLD AUTO: 0.4 %
BENZODIAZ UR QL SCN: ABNORMAL
BILIRUB UR STRIP.AUTO-MCNC: NEGATIVE MG/DL
BUN SERPL-MCNC: 10 MG/DL (ref 6–23)
BZE UR QL SCN: ABNORMAL
CALCIUM SERPL-MCNC: 8.5 MG/DL (ref 8.6–10.3)
CANNABINOIDS UR QL SCN: ABNORMAL
CHLORIDE SERPL-SCNC: 106 MMOL/L (ref 98–107)
CO2 SERPL-SCNC: 25 MMOL/L (ref 21–32)
COLOR UR: NORMAL
CREAT SERPL-MCNC: 0.9 MG/DL (ref 0.5–1.05)
EGFRCR SERPLBLD CKD-EPI 2021: 85 ML/MIN/1.73M*2
EOSINOPHIL # BLD AUTO: 0.18 X10*3/UL (ref 0–0.7)
EOSINOPHIL NFR BLD AUTO: 2.2 %
ERYTHROCYTE [DISTWIDTH] IN BLOOD BY AUTOMATED COUNT: 11.8 % (ref 11.5–14.5)
ETHANOL SERPL-MCNC: <10 MG/DL
FENTANYL+NORFENTANYL UR QL SCN: ABNORMAL
GLUCOSE BLD MANUAL STRIP-MCNC: 116 MG/DL (ref 74–99)
GLUCOSE SERPL-MCNC: 107 MG/DL (ref 74–99)
GLUCOSE UR STRIP.AUTO-MCNC: NORMAL MG/DL
HCT VFR BLD AUTO: 38.9 % (ref 36–46)
HGB BLD-MCNC: 13.1 G/DL (ref 12–16)
IMM GRANULOCYTES # BLD AUTO: 0.02 X10*3/UL (ref 0–0.7)
IMM GRANULOCYTES NFR BLD AUTO: 0.2 % (ref 0–0.9)
KETONES UR STRIP.AUTO-MCNC: NEGATIVE MG/DL
LEUKOCYTE ESTERASE UR QL STRIP.AUTO: NEGATIVE
LYMPHOCYTES # BLD AUTO: 2.75 X10*3/UL (ref 1.2–4.8)
LYMPHOCYTES NFR BLD AUTO: 34 %
MAGNESIUM SERPL-MCNC: 1.75 MG/DL (ref 1.6–2.4)
MCH RBC QN AUTO: 30.8 PG (ref 26–34)
MCHC RBC AUTO-ENTMCNC: 33.7 G/DL (ref 32–36)
MCV RBC AUTO: 92 FL (ref 80–100)
METHADONE UR QL SCN: ABNORMAL
MONOCYTES # BLD AUTO: 0.6 X10*3/UL (ref 0.1–1)
MONOCYTES NFR BLD AUTO: 7.4 %
NEUTROPHILS # BLD AUTO: 4.52 X10*3/UL (ref 1.2–7.7)
NEUTROPHILS NFR BLD AUTO: 55.8 %
NITRITE UR QL STRIP.AUTO: NEGATIVE
NRBC BLD-RTO: 0 /100 WBCS (ref 0–0)
OPIATES UR QL SCN: ABNORMAL
OXYCODONE+OXYMORPHONE UR QL SCN: ABNORMAL
PCP UR QL SCN: ABNORMAL
PH UR STRIP.AUTO: 6.5 [PH]
PLATELET # BLD AUTO: 301 X10*3/UL (ref 150–450)
POTASSIUM SERPL-SCNC: 3.7 MMOL/L (ref 3.5–5.3)
PROT UR STRIP.AUTO-MCNC: NEGATIVE MG/DL
RBC # BLD AUTO: 4.25 X10*6/UL (ref 4–5.2)
RBC # UR STRIP.AUTO: NEGATIVE MG/DL
SALICYLATES SERPL-MCNC: 9 MG/DL (ref ?–20)
SODIUM SERPL-SCNC: 139 MMOL/L (ref 136–145)
SP GR UR STRIP.AUTO: 1.01
UROBILINOGEN UR STRIP.AUTO-MCNC: NORMAL MG/DL
WBC # BLD AUTO: 8.1 X10*3/UL (ref 4.4–11.3)

## 2025-06-01 PROCEDURE — 80307 DRUG TEST PRSMV CHEM ANLYZR: CPT

## 2025-06-01 PROCEDURE — 80048 BASIC METABOLIC PNL TOTAL CA: CPT

## 2025-06-01 PROCEDURE — 96361 HYDRATE IV INFUSION ADD-ON: CPT

## 2025-06-01 PROCEDURE — 2500000004 HC RX 250 GENERAL PHARMACY W/ HCPCS (ALT 636 FOR OP/ED): Mod: JZ

## 2025-06-01 PROCEDURE — 83735 ASSAY OF MAGNESIUM: CPT

## 2025-06-01 PROCEDURE — 85025 COMPLETE CBC W/AUTO DIFF WBC: CPT

## 2025-06-01 PROCEDURE — 96374 THER/PROPH/DIAG INJ IV PUSH: CPT

## 2025-06-01 PROCEDURE — 80320 DRUG SCREEN QUANTALCOHOLS: CPT

## 2025-06-01 PROCEDURE — 36415 COLL VENOUS BLD VENIPUNCTURE: CPT

## 2025-06-01 PROCEDURE — 81003 URINALYSIS AUTO W/O SCOPE: CPT

## 2025-06-01 PROCEDURE — 82947 ASSAY GLUCOSE BLOOD QUANT: CPT

## 2025-06-01 RX ORDER — ONDANSETRON HYDROCHLORIDE 2 MG/ML
4 INJECTION, SOLUTION INTRAVENOUS ONCE
Status: COMPLETED | OUTPATIENT
Start: 2025-06-01 | End: 2025-06-01

## 2025-06-01 RX ADMIN — ONDANSETRON 4 MG: 2 INJECTION, SOLUTION INTRAMUSCULAR; INTRAVENOUS at 00:17

## 2025-06-01 RX ADMIN — SODIUM CHLORIDE 1000 ML: 900 INJECTION, SOLUTION INTRAVENOUS at 00:15

## 2025-06-01 ASSESSMENT — PAIN - FUNCTIONAL ASSESSMENT: PAIN_FUNCTIONAL_ASSESSMENT: 0-10

## 2025-06-01 ASSESSMENT — PAIN SCALES - GENERAL: PAINLEVEL_OUTOF10: 0 - NO PAIN

## 2025-06-01 NOTE — DISCHARGE INSTRUCTIONS
Your lab work is reassuring, please use caution when using substances such as THC mixed with your prescribed medications, please follow-up with your primary care provider, please return the ER for any worsening symptoms

## 2025-06-01 NOTE — ED PROVIDER NOTES
HPI   No chief complaint on file.      Patient is a 35-year-old female presenting with a chief complaint of generalized weakness.  Patient states she took a THC gummy and clonidine around 10:30 PM as well as gabapentin to try to help her sleep, she states that her head felt heavy, does not hurt no chest pain, no dizziness or blurred vision, no other acute complaints at this time      History provided by:  Patient          Patient History   Medical History[1]  Surgical History[2]  Family History[3]  Social History[4]    Physical Exam   ED Triage Vitals   Temp Pulse Resp BP   -- -- -- --      SpO2 Temp src Heart Rate Source Patient Position   -- -- -- --      BP Location FiO2 (%)     -- --       Physical Exam  Vitals and nursing note reviewed. Exam conducted with a chaperone present.   Constitutional:       General: She is not in acute distress.     Appearance: Normal appearance. She is well-developed and normal weight. She is not ill-appearing, toxic-appearing or diaphoretic.   HENT:      Head: Normocephalic and atraumatic.      Nose: Nose normal.      Mouth/Throat:      Mouth: Mucous membranes are moist.      Pharynx: Oropharynx is clear.   Eyes:      Extraocular Movements: Extraocular movements intact.      Conjunctiva/sclera: Conjunctivae normal.      Pupils: Pupils are equal, round, and reactive to light.   Cardiovascular:      Rate and Rhythm: Normal rate and regular rhythm.      Pulses: Normal pulses.      Heart sounds: Normal heart sounds. No murmur heard.  Pulmonary:      Effort: Pulmonary effort is normal. No respiratory distress.      Breath sounds: Normal breath sounds.   Abdominal:      General: Abdomen is flat. Bowel sounds are normal.      Palpations: Abdomen is soft.      Tenderness: There is no abdominal tenderness.   Musculoskeletal:         General: No swelling. Normal range of motion.      Cervical back: Normal range of motion and neck supple.   Skin:     General: Skin is warm and dry.       Capillary Refill: Capillary refill takes less than 2 seconds.   Neurological:      General: No focal deficit present.      Mental Status: She is alert and oriented to person, place, and time. Mental status is at baseline.      Cranial Nerves: No cranial nerve deficit.   Psychiatric:         Mood and Affect: Mood normal.         Behavior: Behavior normal.         Thought Content: Thought content normal.         Judgment: Judgment normal.           ED Course & MDM   Diagnoses as of 06/01/25 0256   Encounter for medical screening examination                 No data recorded                                 Medical Decision Making  Patient seen and evaluated at bedside, patient is in no acute distress.  I will order a CBC, CMP, magnesium, toxicology panel, drug screen, urinalysis, normal saline, Zofran. Differential diagnosis includes but is not limited to side effect of THC usage, electrolyte abnormality, anxiety attack, electrolyte abnormality  Patient's lab work is reassuring, no electrolyte abnormalities, drug screen positive for cannabinoids, patient reports improvement symptoms, patient will be discharged, cautioned on the use of THC with prescription medications, no  Diagnosis: Encounter for medical screening examination  Results for orders placed or performed during the hospital encounter of 05/31/25  -CBC and Auto Differential:   Collection Time: 06/01/25 12:16 AM       Result                      Value             Ref Range           WBC                         8.1               4.4 - 11.3 x*       nRBC                        0.0               0.0 - 0.0 /1*       RBC                         4.25              4.00 - 5.20 *       Hemoglobin                  13.1              12.0 - 16.0 *       Hematocrit                  38.9              36.0 - 46.0 %       MCV                         92                80 - 100 fL         MCH                         30.8              26.0 - 34.0 *       MCHC                         33.7              32.0 - 36.0 *       RDW                         11.8              11.5 - 14.5 %       Platelets                   301               150 - 450 x1*       Neutrophils %               55.8              40.0 - 80.0 %       Immature Granulocytes *     0.2               0.0 - 0.9 %         Lymphocytes %               34.0              13.0 - 44.0 %       Monocytes %                 7.4               2.0 - 10.0 %        Eosinophils %               2.2               0.0 - 6.0 %         Basophils %                 0.4               0.0 - 2.0 %         Neutrophils Absolute        4.52              1.20 - 7.70 *       Immature Granulocytes *     0.02              0.00 - 0.70 *       Lymphocytes Absolute        2.75              1.20 - 4.80 *       Monocytes Absolute          0.60              0.10 - 1.00 *       Eosinophils Absolute        0.18              0.00 - 0.70 *       Basophils Absolute          0.03              0.00 - 0.10 *  -Basic metabolic panel:   Collection Time: 06/01/25 12:16 AM       Result                      Value             Ref Range           Glucose                     107 (H)           74 - 99 mg/dL       Sodium                      139               136 - 145 mm*       Potassium                   3.7               3.5 - 5.3 mm*       Chloride                    106               98 - 107 mmo*       Bicarbonate                 25                21 - 32 mmol*       Anion Gap                   12                10 - 20 mmol*       Urea Nitrogen               10                6 - 23 mg/dL        Creatinine                  0.90              0.50 - 1.05 *       eGFR                        85                >60 mL/min/1*       Calcium                     8.5 (L)           8.6 - 10.3 m*  -Magnesium:   Collection Time: 06/01/25 12:16 AM       Result                      Value             Ref Range           Magnesium                   1.75              1.60 - 2.40 *  -Acute Toxicology  Panel, Blood:   Collection Time: 06/01/25 12:16 AM       Result                      Value             Ref Range           Acetaminophen               <10.0             10.0 - 30.0 *       Salicylate                  9                 4 - 20 mg/dL        Alcohol                     <10               <=10 mg/dL     -POCT GLUCOSE:   Collection Time: 06/01/25 12:31 AM       Result                      Value             Ref Range           POCT Glucose                116 (H)           74 - 99 mg/dL  -Drug Screen, Urine:   Collection Time: 06/01/25  1:34 AM       Result                      Value             Ref Range           Amphetamine Screen, Ur*                       Presumptive *   Presumptive Negative       Barbiturate Screen, Ur*                       Presumptive *   Presumptive Negative       Benzodiazepines Screen*                       Presumptive *   Presumptive Negative       Cannabinoid Screen, Ur*                       Presumptive *   Presumptive Positive (A)       Cocaine Metabolite Scr*                       Presumptive *   Presumptive Negative       Fentanyl Screen, Urine                        Presumptive *   Presumptive Negative       Opiate Screen, Urine                          Presumptive *   Presumptive Negative       Oxycodone Screen, Urine                       Presumptive *   Presumptive Negative       PCP Screen, Urine                             Presumptive *   Presumptive Negative       Methadone Screen, Urine                       Presumptive *   Presumptive Negative  -Urinalysis with Reflex Culture and Microscopic:   Collection Time: 06/01/25  1:35 AM       Result                      Value             Ref Range           Color, Urine                Light-Yellow      Light-Yellow*       Appearance, Urine           Clear             Clear               Specific Gravity, Urine     1.014             1.005 - 1.035       pH, Urine                   6.5               5.0, 5.5, 6.*        Protein, Urine              NEGATIVE          NEGATIVE, 10*       Glucose, Urine              Normal            Normal mg/dL        Blood, Urine                NEGATIVE          NEGATIVE mg/*       Ketones, Urine              NEGATIVE          NEGATIVE mg/*       Bilirubin, Urine            NEGATIVE          NEGATIVE mg/*       Urobilinogen, Urine         Normal            Normal mg/dL        Nitrite, Urine              NEGATIVE          NEGATIVE            Leukocyte Esterase, Ur*     NEGATIVE          NEGATIVE       .        Procedure  Procedures  Sections of this report were created using voice-to-text technology and may contain errors in translation    Shamir Crocker DO  Emergency Medicine           [1]   Past Medical History:  Diagnosis Date    Alcohol intoxication 02/13/2024    Allergic rhinitis     Anemia     Drug withdrawal syndrome (Multi) 10/21/2017    Myofascial pain 03/27/2017   [2]   Past Surgical History:  Procedure Laterality Date    HERNIA REPAIR  2015    MISENTARY HERNIA    ORTHODONTIC TREATMENT     [3]   Family History  Problem Relation Name Age of Onset    Breast cancer Mother Renetta     Depression Mother Renetta     Anxiety disorder Mother Renetta     Cancer Mother Renetta     Hypertension Mother Renetta     Mental illness Mother Renetta     Depression Sister      Anxiety disorder Sister      Breast cancer Mother's Sister      Hypertension Mother's Sister      Hypertension Maternal Grandmother mariann     Hypertension Maternal Grandfather elizabeth     Stroke Maternal Grandfather elizabeth     Hypertension Paternal Grandmother      Hypertension Paternal Grandfather      Alcohol abuse Mother's Sister nicola     Hypertension Mother's Sister nicola     Asthma Sister kyaw     Depression Sister kyaw     Mental illness Sister kyaw     Cancer Mother's Sister ade    [4]   Social History  Tobacco Use    Smoking status: Never    Smokeless tobacco: Never   Vaping Use    Vaping status: Never Used   Substance Use Topics     Alcohol use: Yes     Comment: SOCIALLY    Drug use: Never        Shamir Crocker, DO  06/01/25 0259

## 2025-06-02 ENCOUNTER — TELEPHONE (OUTPATIENT)
Dept: SLEEP MEDICINE | Facility: CLINIC | Age: 35
End: 2025-06-02

## 2025-06-02 ENCOUNTER — OFFICE VISIT (OUTPATIENT)
Dept: SLEEP MEDICINE | Facility: HOSPITAL | Age: 35
End: 2025-06-02
Payer: COMMERCIAL

## 2025-06-02 ENCOUNTER — APPOINTMENT (OUTPATIENT)
Dept: ORTHOPEDIC SURGERY | Facility: CLINIC | Age: 35
End: 2025-06-02
Payer: COMMERCIAL

## 2025-06-02 DIAGNOSIS — M79.7 FIBROMYALGIA: ICD-10-CM

## 2025-06-02 DIAGNOSIS — F33.1 MODERATE EPISODE OF RECURRENT MAJOR DEPRESSIVE DISORDER: ICD-10-CM

## 2025-06-02 DIAGNOSIS — J45.902 ASTHMA WITH STATUS ASTHMATICUS, UNSPECIFIED ASTHMA SEVERITY, UNSPECIFIED WHETHER PERSISTENT (HHS-HCC): ICD-10-CM

## 2025-06-02 DIAGNOSIS — G47.21 CIRCADIAN RHYTHM SLEEP-WAKE DISORDERS, DELAYED SLEEP PHASE TYPE: ICD-10-CM

## 2025-06-02 DIAGNOSIS — F41.1 GAD (GENERALIZED ANXIETY DISORDER): Primary | ICD-10-CM

## 2025-06-02 DIAGNOSIS — G25.81 RLS (RESTLESS LEGS SYNDROME): ICD-10-CM

## 2025-06-02 DIAGNOSIS — G47.52 RBD (REM BEHAVIORAL DISORDER): ICD-10-CM

## 2025-06-02 PROCEDURE — 1036F TOBACCO NON-USER: CPT

## 2025-06-02 PROCEDURE — 99215 OFFICE O/P EST HI 40 MIN: CPT

## 2025-06-02 PROCEDURE — 99215 OFFICE O/P EST HI 40 MIN: CPT | Mod: 95

## 2025-06-02 ASSESSMENT — SLEEP AND FATIGUE QUESTIONNAIRES
HOW LIKELY ARE YOU TO NOD OFF OR FALL ASLEEP WHILE WATCHING TV: WOULD NEVER DOZE
ESS-CHAD TOTAL SCORE: 0
SATISFACTION_WITH_CURRENT_SLEEP_PATTERN: DISSATISFIED
DIFFICULTY_STAYING_ASLEEP: SEVERE
HOW LIKELY ARE YOU TO NOD OFF OR FALL ASLEEP WHILE SITTING QUIETLY AFTER LUNCH WITHOUT ALCOHOL: WOULD NEVER DOZE
WORRIED_DISTRESSED_DUE_TO_SLEEP: MUCH
SITING INACTIVE IN A PUBLIC PLACE LIKE A CLASS ROOM OR A MOVIE THEATER: WOULD NEVER DOZE
SLEEP_PROBLEM_INTERFERES_DAILY_ACTIVITIES: MUCH
DIFFICULTY_FALLING_ASLEEP: SEVERE
HOW LIKELY ARE YOU TO NOD OFF OR FALL ASLEEP WHEN YOU ARE A PASSENGER IN A CAR FOR AN HOUR WITHOUT A BREAK: WOULD NEVER DOZE
HOW LIKELY ARE YOU TO NOD OFF OR FALL ASLEEP WHILE SITTING AND TALKING TO SOMEONE: WOULD NEVER DOZE
WAKING_TOO_EARLY: SEVERE
HOW LIKELY ARE YOU TO NOD OFF OR FALL ASLEEP WHILE LYING DOWN TO REST IN THE AFTERNOON WHEN CIRCUMSTANCES PERMIT: WOULD NEVER DOZE
HOW LIKELY ARE YOU TO NOD OFF OR FALL ASLEEP IN A CAR, WHILE STOPPED FOR A FEW MINUTES IN TRAFFIC: WOULD NEVER DOZE
SLEEP_PROBLEM_NOTICEABLE_TO_OTHERS: MUCH
HOW LIKELY ARE YOU TO NOD OFF OR FALL ASLEEP WHILE SITTING AND READING: WOULD NEVER DOZE

## 2025-06-02 NOTE — TELEPHONE ENCOUNTER
"Called patient to discuss the difficulties she had when taking Xywav on 5/29/25 and 5/30/2025. Patient stated the first night she took 2.25 grams at bedtime and fell asleep then took the 2nd dose of 2.25 grams about 4 hrs later. She woke up to an alarm that was set, felt like it was hard to wake up to the alarm when she woke up she was nauseated, decreased appetite and had some extremity tremors. On the 2nd night patient took 2.25 grams at bedtime and had a hard time falling asleep, took second dose about  4:00 am   and feels like she really did not sleep much. She had increase in nausea in the morning, tremors and increased heart rate. Drank Gatorade during the day because she thought maybe she was dehydrated and electrolytes might have been off.     Due to the tremors patient took 1 capsule of Gabapentin 100 mg between 3:30 - 4:00 PM \"because I thought it would help my nerves and tremors\". Patient states this was an Old Prescription that she had left over.    When that did not help the nausea and tremors she took 25 mg THC Gummy and 0.1 mg of Clonidine around 10:30 PM. She states about 11:00 pm she felt heavy and weak, took Blood pressure on home BP Cuff and the pressure was SBP was 88 but does not remember DBP. She said when she got to the ED her SBP had risen into 90's.     Patient thinks that the syptoms she is having may be related to SSRI withdrawal.     Patient declines wanting to attempt taking Xywav again at this time and has an appointment with her Phychiatric Nurse Practitioner tomorrow.  Will call to schedule follow-up with Dr. Kelley or Dr. Dhillon tomorrow.   "

## 2025-06-02 NOTE — PATIENT INSTRUCTIONS
"       [facility] Sleep Medicine  [facility]  [address]  [facility] [address] [phone]       Thank you for coming to the Sleep Medicine Clinic today! Your sleep medicine provider today was: CHAY Mar Below is a summary of your treatment plan, patient education, other important information, and our contact numbers.    Dear  Ms Mahi De Diosluis       Your Sleep Provider Today: CHAY Mar  Your Primary Care Physician: CHAY Cummins   Your Referring Provider: No ref. provider found    Thank you for visiting  Sleep Medicine Clinic !     1. According to your symptom. I will refer you to see Dr. Kelley for further treatment.    2. Please do not drive when you are sleepy and continue practicing the sleep hygiene as discussed in clinic.      TREATMENT PLAN     - Please read the \"Patient Education\" section below for more detailed information. Try implementing tips, reminders, strategies, and supportive management.   - If not yet done, please sign up for [employer] to make a future schedule, send prescription requests, or send messages.      PATIENT EDUCATION     Restless Legs Syndrome (RLS) is a clinical diagnosis wherein you have the urge to move your legs while sitting or lying down, occurring usually at night or worse at night, and is partially or completely relieved with movement (massage, stretching, walking etc). RLS is usually related with low iron in the brain and gets worse with caffeine, nicotine, alcohol, and certain medications (antidepressants, antipsychotics, sedating antihistamines, metoclopromide, melatonin, coQ10, topiramate, some antihypertensives, some antibiotics, albuterol, lipitor, levothyroxine)    General care for RLS:  If you are taking medications that triggers or worsens RLS, consider consulting with sleep specialist and/or prescribing doctor about tapering off these medications or switching to a " different class of medications if feasible.  Avoid nicotine, alcohol, and caffeine containing substances, such as chocolate and sodas, as these can make symptoms worse.   Try exercise (ie walking) and warm baths 2 hours before bedtime. May also try stretching the legs against the wall prior going to bed, massaging legs while in bed, or doing leg compression.   If RLS symptoms occur 2 to 3 times per week, consider checking lab work to see if certain vitamins or minerals are depleted (i.e. iron levels) and/or start medication for RLS (i.e. ropinirole or pramipexole vs gabapentin).   If you need to take iron pill for RLS:  Take you iron pill on empty stomach either 30-60 minutes before meal or 2 hours after meal.   Take your iron pill with 100-200 mg vitamin C or 1 glass  of orange juice for better absorption.   Do not take your iron pill with milk or dairy products nor with levothyroxine. As much as possible, maintain 2-3 hours difference between milk or levothyroxine intake and iron pill intake.   If iron pill not tolerated, consider iron infusion.    Trouble falling asleep or trouble staying asleep is called INSOMNIA and it can be caused by many different things such as untreated sleep apnea, anxiety, depression, stress, poor sleep habits, and other medical conditions or medications. The best way to treat insomnia is to treat the cause. In general, we can all benefit from better sleep hygiene (also known as good sleep habits). Below are recommendations to help you improve your sleep so you can fall asleep, stay asleep, and wake up feeling refreshed.    Keep a routine bedtime and rise time even on weekends and non-work days. This helps your biological clock stay in sync with your sleep needs. If you currently have variable sleep-wake schedule, start off by waking up and getting out of bed the same time every day, even on weekends or non-work days. Whether you have a good or poor sleep the night before, waking up at  "the same time every day can help re-train and reset your body clock.  Go to bed when you are sleepy and not when tired nor before your goal bedtime. Long periods of time in bed will lead to fragmented, shallow, broken sleep.   Use the bed for sleep and intimacy only. Do not watch TV, eat, read or use phone/laptop in bed. Keeping sleep as the only activity in bed will help re-associate bed equals sleep.  Get up when you cannot sleep in 15-20 minutes. When you are unable to sleep, exit the bed, and go to another room or chair in bedroom, do something boring, calm, relaxing, distracting, or non-stimulating in dim lighting until you feel sleepy enough to fall back asleep. Avoid stimulating activity or any triggers for mental thinking (e.g. cellphone). Repeat as needed.  Avoid napping during the day to build up sleep pressure so that it won't be hard for you to fall asleep at night. Napping, particularly in the late afternoon or early evening may interfere with your night's sleep. If naps are necessary, limit naps under 15-20 minutes and not later than 3 PM.   Create a \"buffer zone\" or \"wind-down time\". This is a quiet time prior to bedtime, typically at least 30 minutes and perhaps as long as 1-2 hours. During this time, you should do things that are enjoyable, relaxing, and not necessarily goal-oriented like performing relaxation exercises, listening to relaxing music, reading a boring book, dimming the lights, or eating a light bedtime snack like a glass of milk and banana which can promote sleep. Activities that promote relaxation before sleep is important because these can help quiet the mind and relax the body to get into the right state for sleep.   Do not worry or plan in bed. If you are worrying, planning, feeling anxious, or cannot shut off your thoughts, get up and stay out of bed until you have quieted your mind. Set up a “scheduled worry time” in the morning or late afternoon to write down your worries and " "stressors as well as plans for the following day.   Keep your bedroom quiet, dark, and cool. Ideal sleeping temperature is 65F. If light bothers you, get a slumber mask or blackout shades. If noise bothers you, put ear plugs or get a white noise machine. Alternatively, you may turn on fan or humidifier to create a constant background noise to eliminate unexpected sounds that would otherwise wake you up in the middle of your sleep.  Keep your bedroom technology free. Avoid exposure to TV and electronics 1-2 hours prior to bedtime. May also consider wearing \"blue light blocker\" eyeglasses.  Turn the clock around to avoid clock-watching. Thoughts of the time can cause frustration and make it more difficult to go to sleep.   Other things to avoid to help   Avoid  caffeine (including chocolate) intake in the late afternoon and early evening. Limit the amount of caffeine and consume before noon.   Avoid smoking 2-3 hours before bedtime. Like caffeine, nicotine in cigarette smoking acts a stimulant.   Avoid alcohol intake 2-3 hours before bedtime. Although alcohol may seem to help you fall asleep more easily as it slows down brain activity, it also disrupts your sleep during the night by causing frequent awakenings as the effect of alcohol wears off. Additionally, alcohol worsens snoring and sleep apnea  Avoid eating a heavy meal (high-fat, high-carbohydrate, gas-producing foods) at dinner and 2-3 hours before bedtime.    Avoid drinking more than 8 oz liquids in the evening. Limit fluid intake at 8 oz during dinner. Restrict fluids after dinner. May take sips of water enough to swallow bedtime medications. Void at bedtime.  Avoid physical exercise or hot shower/bath within 2 hours of bedtime. Regular exercise can improve sleep quality, but exercising or having a hot shower/bath too close to bedtime can disrupt sleep onset. The best time to exercise to help sleep is in the late afternoon or early evening but not within 2 " hours of bedtime. In order to promote sleep, hot shower/bath can be taken in the evening for 15-20 minutes but not within 2 hours of going to bed.   Avoid sleeping with pets or kids if they appear to bother your sleep and/or sleep quality.  Last but the least, DO NOT TRY TOO HARD TO SLEEP. JUST ALLOW SLEEP TO UNFOLD.    MOST IMPORTANTLY:  BE PATIENT!  BE CONSISTENT!  Your sleep problem developed over time so it will take some time and consistency to return to a more normal sleep pattern. By following the suggestions listed above, you should see gradual sleep improvements over time.    Also you have been recommended to do Cognitive Behavioral Therapy for Insomnia (CBT-I). CBT-I is widely recognized as an effective treatment for a wide range of insomnias. CBT-I is typically made up of a number of components including assessment, behavioral and cognitive interventions, motivational techniques, and relapse prevention skills. An overwhelming amount of evidence suggests that CBT-I is as effective as hypnotics and the newer non-benzodiazepine sleep aides in the acute treatment and is more effective than medication in the long term treatment of insomnia.     Below are some resources for CBT-I:  To see a Behavioral Sleep Medicine specialist for one-on-one counseling  CBT-I at the [facility] - Dr. Shaq Tolentino PsyD or Dr. Brea Chavira, PhD - Phone: [phone]  Fax: [phone]. There may be a several month waiting period.  CBT-I at [facility] - Ibeth Rajput PsyD (Call [phone] and speak with Yudy Maria  Fax: [phone] or backup fax: [phone])  Dr. Joanne Marquez - [email or web address]  - She only does telemedicine. She was formerly part of  but is in private practice and would be out-of-network  Dr. Gray - one on one CBT-I service www.[email or web address]. You may have to pay out of pocket and submit visits to your insurance for reimbursement.  Other BSM providers in [place]:   https://www.behavioralsleep.org/index.php/directory/browse-by/state?value=[place]    2. If doing CBTi using an online platform, there are several online platforms that are good resources, but may vary based on cost. These include:  Go[employer]- online course via [facility]. Self-guided. One time charge to sign up: Natalie ($40)  [employer] - online guided cognitive behavioral therapy https://www.[employer]/pricing ($300 for 8 weeks)  Sleepio - https://www.sleepio.com/sleepio/welcomeusint/354#1/1 (Some insurances or employers may cover - check with your HR Benefits office)  SleepEZ- Free self-guided course from the [facility] https://www.veterantraining.va.gov/insomnia/    You can also go to the following EDUCATION WEBSITES for further information:   American Academy of Sleep Medicine http://sleepeducation.org  National Sleep Foundation: https://sleepfoundation.org  American Sleep Apnea Association: https://www.sleepapnea.org (for patients with sleep apnea)  Narcolepsy Network: https://www.narcolepsynetwork.org (for patients with narcolepsy)  [employer]ake[employer]ole[employer]: https://www.wakeupnarcolepsy.org (for patients with narcolepsy)  Hypersomnia Foundation: https://www.hypersomniafoundation.org (for patients with idiopathic hypersomnia)  RLS foundation: https://www.rls.org (for patients with restless leg syndrome)    IMPORTANT INFORMATION     Call 911 for medical emergencies.  Our offices are generally open from Monday-Friday, 8 am - 5 pm.   There are no supporting services by either the sleep doctors or their staff on weekends and Holidays, or after 5 PM on weekdays.   If you need to get in touch with me, you may either call my office number or you can use [employer].  If a referral for a test, for CPAP, or for another specialist was made, and you have not heard about scheduling this within a week, please call scheduling at [phone] ([phone]).  If you are unable to make your appointment for clinic or an overnight study, kindly call  the office or sleep testing center at least 48 hours in advance to cancel and reschedule.  If you are on CPAP, please bring your device's card and/or the device to each clinic appointment.   In case of problems with PAP machine or mask interface, please contact your DME (Durable Medical Equipment) company first. DME is the company who provides you the machine and/or PAP supplies.       PRESCRIPTIONS     We require 7 days advanced notice for prescription refills. If we do not receive the request in this time, we cannot guarantee that your medication will be refilled in time.    IMPORTANT PHONE NUMBERS     Sleep Medicine Clinic Fax: [phone]  Appointments (for Pediatric Sleep Clinic): [phone] - option 1  Appointments (for Adult Sleep Clinic): [phone] - option 2  Appointments (For Sleep Studies): [phone] - option 3  Behavioral Sleep Medicine: [phone]  Sleep Surgery: [phone]  Nutrition Service: [phone]  Weight management clinics with endocrinology: [phone]  Bariatric Services: [phone] (includes weight loss medications and weight loss surgery)  [facility]: [phone] (offers holistic approaches to weight management)  ENT (Otolaryngology): [phone]  Headache Clinic (Neurology): [phone]  Neurology: [phone]  Psychiatry: [phone]  Pulmonary Function Testing (PFT) Center: [phone]  Pulmonary Medicine: [phone]  Medical Service Company (DME): [phone]      OUR SLEEP TESTING LOCATIONS     Our team will contact you to schedule your sleep study, however, you can contact us as follow:  Main Phone Line (scheduling only): [phone]), option 3  Adult and Pediatric Locations  [facility] (6 years and older): [facility] - 4th floor ([address] [place] [address]) After hours line: [phone]  [facility] (Main campus: All ages): [facility], 6th floor.  "After hours line: [phone]   [place] (5 years and older; younger considered on case-by-case basis): [address]; [address], [address]. Scheduling  After hours line: [phone]   [place] (6 years and older): [address]; [address]; [address]   [place] (6 years and older): [address] [facility], [address]  After hours line: [phone]   [place] (13 years and older) in [place]: [address], 2nd floor  After hours line: [phone]   [place] (13 year and older): [address]  After hours line: [phone]     Adult Only Locations:   [place] (18 years and older): [address] [facility], 2nd floor   [place] (18 years and older): [address] [facility]; 4th floor  After hours line: [phone]  [facility] (18 years and older) at [place]: [address] [place] [address]  After hours line: [phone]     CONTACTING YOUR SLEEP MEDICINE PROVIDER AND SLEEP TEAM      For issues with your machine or mask interface, please call your DME provider first. DME stands for durable medical company. DME is the company who provides you the machine and/or PAP supplies / accessories.   To schedule, cancel, or reschedule SLEEP STUDY APPOINTMENTS, please call the Main Phone Line at [phone] - option 3.   To schedule, cancel, or reschedule CLINIC APPOINTMENTS, you can do it in \"[employer]\", call [phone] to speak with my  (Pat Parsons), or call the Main Phone Line at [phone]) - option 2  For CLINICAL QUESTIONS or MEDICATION REFILLS, please call direct line for Adult Sleep Nurses at [phone].   Lastly, you can also send a message directly to your provider through \"My Chart\", which is the email service through your  Records Account: [email or web address]       Here at [facility], we wish you a restful sleep!   "

## 2025-06-02 NOTE — PROGRESS NOTES
[facility]  [facility] JAQUAN  [address]  [facility] [address]  [phone]     [facility]  Follow Up Visit Note    Virtual or Telephone Consent    An interactive audio and video telecommunication system which permits real time communications between the patient (at the originating site) and provider (at the distant site) was utilized to provide this telehealth service.   Verbal consent was requested and obtained from Mahi Cuba on this date, 06/02/25 for a telehealth visit and the patient's location was confirmed at the time of the visit.       Subjective   Patient ID: Mahi Cuba is a 35 y.o. female with past medical history significant for seasonal allergies, RLS, RBD, nightmare disorder, and idiopathic hypersomnia     6/2/2025: UPDATED: The patient had a virtual visit with me for a follow-up of her Xywave medication for her hypersomnia. She saw Dr. Philip on 4/16/2025 and started taking Modafinil 200 mg.  However, she reports extreme irritability, waking up panicking (racing heart and feeling anxious) in the middle of sleep, bugs under her skin, emotional lability, and insomnia. Once the  Modafinil stopped, irritability had resolved. After doing the PSG-MSLT, the patient did not resume Fluoxetine, and she does not get nightmares anymore.  On May 15, 2025, Dr. Philip saw the patient and prescribed Xywave to ease her symptoms. On 5/30/25 night, the patient started taking  2.25 grams at bedtime and fell asleep, then took the 2nd dose of 2.25 grams about 4 hrs later. She woke up to an alarm that had been set, and she felt like it was hard to wake up to it. When she woke up, she was nauseated, had decreased appetite, and had some extremity tremors. She had increasing nausea in the morning, tremors, and increased heart rates. She drank Gatorade during the day because she thought she might be dehydrated  and her electrolytes might be off. On 5/31/25 night, she started taking Xywav 2.25 g and started having palpitations. She took her second 2.25 g dose at 5 AM, and she was still very sweaty, very uncomfortable, and nauseous. On Saturday, 6/1/25, due to the unresolved tremors and nerves. She took Gabapentin 100 mg of (the old prescription that she had left over) around 3:30 - 4:PM  to help with nausea and tremors. However, it did not help, so she took  25mg of THC gummies and Clonidine 0.1 mg around 10:30 PM to ease the symptoms.  She stated that at about 11 pm, she felt heavy and weak. She took her Blood pressure on a home BP Cuff, and the pressure was SBP 88, but she does not remember DBP. She called 911, and an ambulance was sent her to the hospital., She said when she got to the ED her SBP had risen into 90's. The patient thinks that the symptoms she is having may be related to SSRI withdrawal. Today, she said that it is very hard to fall asleep and has become insomnia instead of hypersomnia, and she does not know what she can do for the next step. I discussed this with Dr. Kelley/Jacquie, who instructed me to lower the Xywav dose to 1.25 g and stop all other medication. The sleep nurse, luis felipe Aguirre, called the patient and explain this plan, but the patient declined it. She will follow up with her Psychiatrist provider to restart her Prozacs. She would schedule an appointment to follow up with Dr. Kelley or Mariam for the first available appointment. Her ESS is 0, and her RACHEL  is 21 today.     Interval History  Patient was last seen in 4/16/2025. Plan was to start modafinil. Since last visit, miley still complains of sleep inertia. Also complains medication side effects such as extreme irritability, waking up panicking (racing heart and feeling anxious) in the middle of sleep, bugs under skin, emotional lability, and insomnia. After stopping modafinil, irritability had resolved. After doing the PSG-MSLT, patient did  not resume Fluoxetine and she does not get nightmares anymore. However, she sometimes have dreams of snakes in bed and would wake up panicking. Patient reports that her sleep-onset insomnia did not improve even after stopping modafinil. Patient states that even if she did not drink caffeine and had a lot of yardwork, she still cannot fall asleep at bedtime; hence, she is now taking THC gummies as needed. On the other hand, she reports that sleep-maintenance insomnia had resolved after stopping fluoxetine and modafinil. (Dr. Salima Philip)    PERTINENT FAMILY HISTORY:  RLS Follow-up:   Still have RLS 2x per week but not bothersome. Patient thinks it is related to anxiety      SLEEP STUDY HISTORY (personally reviewed raw data such as interpretation report, data sheet, hypnogram, and titration table if available and applicable)  PSG 12/9/2024: RDI3% 3.8 (Supine RDI3% 6.2, non-supine RDI3% 2), RDI4% 0.7 (Supine RDI4% 1.2, non-supine RDI4% 0.3), SpO2 emilie 94%, sleep efficiency 85.2%sleep latency 21 mins, REM latency 127.5 mins (med list showed fluoxetine and seroquel)   PSG 4/2/2025: RDI3% 4.8 (Supine RDI3% 5.9, non-supine RDI3% 1.3), RDI4% 1.5 (supine RDI4% 1.8, non-supine RDI4% 0.6), SpO2 emilie 92%, sleep latency 106 mins, REM latency 24.5 mins, sleep efficiency 75.2%, TST 6.6 hours, N1 6.1%, N2 50.2%, N3 12.2%, REM 31.5%, arousal index 13.6 (mostly spontaneous), PLM index 0  MSLT 4/3/2025: MSL 9.9 mins, no SOREMP. Med list showed no meds. UDS was neg. Sleep log showed average sleep duration of 8.3 hours (range 7-10 hours) with average bedtime at 2 AM (range 1-4 AM), average sleep onset of 38 minutes, sleeps thru the night every night, and average final wake time of 10:30 AM to 11 AM (range 9 AM to 12 NN). Delayed sleep-wake disorder and sleep onset insomnia was noted as well as make-up sleep during off-work. 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 At night, she reports waking up at night but cannot recall how many times and at what time.     SLEEP ENVIRONMENT  Sleep location: bed  Sleep status: sleeps with   Preferred sleep position: side     SLEEP HABITS   Smoking: no  ETOH: 1 bottle wine once a month  Marijuana: yes. Patient states that   Caffeine: 175 to 200 mg 3 times per week (only when going out to drive somewhere)  Sleep aids: yes on Seroquel     WEIGHT: stable     Claustrophobia: Yes    Active Problems, Allergy List, Medication List, and PMH/PSH/FH/Social Hx have been reviewed and reconciled in chart. No significant changes unless documented in the pertinent chart section. Updates made when necessary.     REVIEW OF SYSTEMS  All other systems have been reviewed and are negative.      ALLERGIES  Allergies[1]    MEDICATIONS  Current Medications[2]      Objective       Physical Exam  Constitutional: Awake, not in distress    Assessment/Plan   Mhai Cuba is a 35 y.o. female presents today in [facility] with the following problems:    # IDIOPATHIC HYPERSOMNIA  -The patient was unable to tolerate Xywav and declined to take low dosage of Xywav.  -Will follow up with her psychiatrist to discuss resuming the Prozac.  -Instructed the patient to call 911 if there was a life-threatening emergency, and the patient verbalized understanding of it.  -Will follow up with Dr. Kelley/ Jacquie for the first available appointment for a future  treatment plan    #BORDERLINE OBSTRUCTIVE SLEEP APNEA, positional (PSG RDI3% 4.8, RDI4% 1.5)  - Reviewed the 2 PSGs done and they both consistently showed mild BARBARA only on supine sleep.   - Highly recommend strict positional therapy.  - Supportive management as follows: Stay off  the back when sleeping.      #RESTLESS LEG SYNDROME: 2/week  - 12/5/2024 ferritin 85, TSAT 34%  - Supportive management: Avoid  triggers of RLS such as caffeine, alcohol, nicotine, medications, and low iron. Taper off or reduce dose of medications that can cause RLS such as but not limited ot antidepressants except Buproprion and Trazodone, 1st generation antihistamines, antipsychotics, anti-emetics except ondansetron, melatonin, topiramate, etc.     # REM SLEEP BEHAVIOR DISORDER  - Used to karate-chop her  in bed while sleeping  - Discussed safety precautions of sleeping environment.   - If patient is taking Xywav, hold off on melatonin.     All of patient's questions were answered. She verbalizes understanding and agreement with my assessment and plan. Refer to after-visit-summary (AVS) for patient education and if applicable, for more details on troubleshooting issues with PAP usage, proper cleaning instructions of PAP supplies, and usual recommended replacement schedule for each of the PAP supplies.      Follow-up with Dr. Kelley or Dr. Dhillon       All of patient's questions were answered. She verbalizes understanding and agreement with my assessment and plan.    Please excuse any errors in grammar or translation related to dictation.           [1] No Known Allergies  [2]   Current Outpatient Medications   Medication Sig Dispense Refill    Airsupra 90-80 mcg/actuation inhaler       cloNIDine (Catapres) 0.1 mg tablet TAKE 1 TABLET BY MOUTH BEDTIME (AS NEEDED). MAY TAKE 1 TO 2 TABS FOR SLEEP.      dicyclomine (Bentyl) 10 mg capsule TAKE 1 CAPSULE BY MOUTH 4 TIMES A DAY AS NEEDED      ibuprofen (Motrin) capsule Take 2 capsules (400 mg) by mouth once daily as needed.      pantoprazole (ProtoNix) 20 mg EC tablet Take 1 tablet (20 mg) by mouth once daily in the morning. Take before meals. Do not crush, chew, or split.       No current facility-administered medications for this visit.

## 2025-06-03 ENCOUNTER — APPOINTMENT (OUTPATIENT)
Dept: GASTROENTEROLOGY | Facility: CLINIC | Age: 35
End: 2025-06-03
Payer: COMMERCIAL

## 2025-06-05 ENCOUNTER — TELEPHONE (OUTPATIENT)
Dept: SLEEP MEDICINE | Facility: CLINIC | Age: 35
End: 2025-06-05
Payer: COMMERCIAL

## 2025-06-05 NOTE — TELEPHONE ENCOUNTER
Patient called and stated she she has remained off of clonidine, THG Gummy's, and Xyway. Unable to get the Fluoxetine because the insurance has not approved the liquid form for titration. Patient states  her anxiety was worse over the past 2 nights and she has had a panic attack going to bed. She asked if the provider covering Dr. Philip would consider prescribing clonazepam for her? She stated she talked about it with Dr. Philip in the past but they did not start it because she was going to try Xywav.

## 2025-06-16 ENCOUNTER — OFFICE VISIT (OUTPATIENT)
Dept: SLEEP MEDICINE | Facility: HOSPITAL | Age: 35
End: 2025-06-16
Payer: COMMERCIAL

## 2025-06-16 DIAGNOSIS — F51.04 CHRONIC INSOMNIA: ICD-10-CM

## 2025-06-16 DIAGNOSIS — G47.21 CIRCADIAN RHYTHM SLEEP DISORDER, DELAYED SLEEP PHASE TYPE: Primary | ICD-10-CM

## 2025-06-16 PROCEDURE — 99215 OFFICE O/P EST HI 40 MIN: CPT | Performed by: INTERNAL MEDICINE

## 2025-06-16 PROCEDURE — 99215 OFFICE O/P EST HI 40 MIN: CPT | Mod: 95 | Performed by: INTERNAL MEDICINE

## 2025-06-16 RX ORDER — ZOLPIDEM TARTRATE 5 MG/1
5 TABLET ORAL NIGHTLY PRN
Qty: 15 TABLET | Refills: 1 | Status: SHIPPED | OUTPATIENT
Start: 2025-06-16 | End: 2025-07-16

## 2025-06-16 ASSESSMENT — SLEEP AND FATIGUE QUESTIONNAIRES
SATISFACTION_WITH_CURRENT_SLEEP_PATTERN: VERY DISSATISFIED
HOW LIKELY ARE YOU TO NOD OFF OR FALL ASLEEP IN A CAR, WHILE STOPPED FOR A FEW MINUTES IN TRAFFIC: WOULD NEVER DOZE
SLEEP_PROBLEM_NOTICEABLE_TO_OTHERS: VERY MUCH NOTICEABLE
HOW LIKELY ARE YOU TO NOD OFF OR FALL ASLEEP WHILE SITTING AND TALKING TO SOMEONE: WOULD NEVER DOZE
HOW LIKELY ARE YOU TO NOD OFF OR FALL ASLEEP WHILE SITTING QUIETLY AFTER LUNCH WITHOUT ALCOHOL: WOULD NEVER DOZE
SITING INACTIVE IN A PUBLIC PLACE LIKE A CLASS ROOM OR A MOVIE THEATER: WOULD NEVER DOZE
HOW LIKELY ARE YOU TO NOD OFF OR FALL ASLEEP WHILE WATCHING TV: SLIGHT CHANCE OF DOZING
WAKING_TOO_EARLY: MILD
WORRIED_DISTRESSED_DUE_TO_SLEEP: VERY MUCH NOTICEABLE
HOW LIKELY ARE YOU TO NOD OFF OR FALL ASLEEP WHEN YOU ARE A PASSENGER IN A CAR FOR AN HOUR WITHOUT A BREAK: SLIGHT CHANCE OF DOZING
DIFFICULTY_STAYING_ASLEEP: MODERATE
HOW LIKELY ARE YOU TO NOD OFF OR FALL ASLEEP WHILE SITTING AND READING: SLIGHT CHANCE OF DOZING
SLEEP_PROBLEM_INTERFERES_DAILY_ACTIVITIES: VERY MUCH NOTICEABLE
DIFFICULTY_FALLING_ASLEEP: SEVERE
ESS-CHAD TOTAL SCORE: 4
HOW LIKELY ARE YOU TO NOD OFF OR FALL ASLEEP WHILE LYING DOWN TO REST IN THE AFTERNOON WHEN CIRCUMSTANCES PERMIT: SLIGHT CHANCE OF DOZING

## 2025-06-16 NOTE — PATIENT INSTRUCTIONS
[facility] Sleep Medicine  [facility] [place]  [address]  [facility] [address]  [phone]    [address]  [facility]  [address] [facility] [address]  [facility] [place]  [address]  [facility] 6TH FLOOR  [facility] [address]           NAME: Mahi Cuba   DATE: 6/16/2025     Your Sleep Provider Today: Brodie Kelley MD PhD  Your Primary Care Physician: Mehreen Montilla, APRN-CNP   Your Referring Provider: No ref. provider found    Thank you for coming to the Sleep Medicine Clinic today! Your sleep medicine provider today was: Brodie Kelley MD PhD Below is a summary of your treatment plan, other important information, and our contact numbers:  If you need to schedule an appointment, please call [phone])  If you need general assistance (e.g. forms completed, general questions, or additional help scheduling), please call my , Jennifer, at [phone].  If you have a medical question about your sleep issues, please contact our nurses, Barbara or Sarah at [phone].   You can also contact us through [employer].      DIAGNOSIS:   1. Circadian rhythm sleep disorder, delayed sleep phase type        2. Chronic insomnia              TREATMENT PLAN       VISIT SUMMARY:  Today, we discussed your ongoing issues with insomnia and anxiety, particularly at night. We reviewed your history with various medications and their effects, as well as your current sleep patterns and challenges. We also talked about potential delayed sleep phase syndrome and how it might be affecting your sleep and daily life.    YOUR PLAN:  -INSOMNIA: Insomnia is a condition where you have trouble falling or staying asleep.  It's not clear if your insomnia is a primary medical disorder or caused by delayed sleep phase synrdome or both. We will start you on a sleep schedule from 2 AM to 10 AM and use zolpidem  5 mg as needed (can even try just 1/2 tablet), no more than 2-3 times per week, to help you fall asleep. Please keep a sleep diary for two weeks and avoid daytime naps, caffeine, and THC. Use dim lighting in the evening to help you wind down. We may consider melatonin in the future if needed.    -DELAYED SLEEP PHASE SYNDROME: Delayed Sleep Phase Syndrome is a disorder where your sleep cycle and wakeup time is delayed by two or more hours beyond the conventional bedtime, causing difficulty waking up in the morning. We recommend adopting a sleep schedule of 2 AM to 10 AM in your  and using dim lighting in the evening to help adjust your circadian rhythm. Avoid bright light exposure in the evening and in the AM before 10AM.    -ANXIETY: Anxiety is a feeling of worry or fear that can interfere with your daily activities. It may be contributing to your insomnia. Continue using relaxation techniques. We talked about listening to Kyler Long audio recordings on Youtube for relaxation.    INSTRUCTIONS:  Please follow the sleep schedule of 2 AM to 10 AM and use zolpidem 5 mg as needed, no more than 2-3 times per week.   Please sign the controlled substance agreement form for the Ambien.  Keep a sleep diary for two weeks and submit it for review. Avoid daytime naps, caffeine, and THC. Use dim lighting in the evening and consider using Kyler Griggs's audio recordings for relaxation.    Follow-up Appointment:   Followup with Dr. Philip in 1 month.      IMPORTANT INFORMATION     Call 911 for medical emergencies.  Our offices are generally open from Monday-Friday, 9 am - 5 pm.  If you need to get in touch with me, you may either call me and my team(number is below) or you can use [employer].  If a referral for a test, for CPAP, or for another specialist was made, and you have not heard about scheduling this within a week, please call scheduling at [phone] ([phone]).  If you are unable to make your appointment for clinic  or an overnight study, kindly call the office at least 48 hours in advance to cancel and reschedule.  If you are on CPAP, please bring your device's card or the device to each clinic appointment.   There are no supporting services by either the sleep doctors or their staff on weekends and Holidays, or after 5 PM on weekdays.   If you have been asked to come to a sleep study, make sure you bring toiletries, a comfy pillow, and any nighttime medications that you may regularly take. Also be sure to eat dinner before you arrive. We generally do not provide meals.      PRESCRIPTIONS     We require 7 days advanced notice for prescription refills. If we do not receive the request in this time, we cannot guarantee that your medication will be refilled in time.      IMPORTANT PHONE NUMBERS      scheduling for medical testin380 - [phone]   Sleep Medicine Clinic Fax: [phone]  Appointments (for Pediatric Sleep Clinic): [phone] - option 1  Appointments (for Adult Sleep Clinic): [phone] - option 2  Appointments (For Sleep Studies): [phone] - option 3  Financial Assistance Program: Call [phone] (For [facility] services: call [phone])  Website:  Financial Assistance  Behavioral Sleep Medicine: [phone]  Bariatric Surgery: [phone] ( Bariatric Surgery Website)   Sleep Surgery: [phone]  ENT (Otolaryngology): [phone]  Myofunctional Therapy (ENT): VIJAYA Pizarro, Patricia Bar, Santos Molina; [phone]   Lady Cullen; [phone]  Otoniel Grover; [phone]  [facility]; [phone]  Satish Quezada/[facility] [phone] (option 1)  Headache Clinic (Neurology): [phone]  Neurology: [phone]  Psychiatry: [phone]  Pulmonary Function Testing (PFT) Center: [phone]  Pulmonary Medicine: [phone]  [employer] ([employer]): [phone]  [employer] [facility] [employer] (DME):  [phone]  [facility] ([facility]): [phone]      COMMON PROVIDERS WE REFER TO     For Weight Loss - Dr. Sharon Cody - Call [phone]  For Sleep Surgery - Dr. Sharon Braga - Call [phone]      OUR ADULT SLEEP MEDICINE TEAM   Please do not hesitate to call the office or sleep nurse with any questions between appointments:    Adult Sleep Nurses (Barbara Starr, RN and Sarah Wylie RN):  For clinical questions and refilling prescriptions: [phone]  Email sleep diaries and other documents at: [email or web address]    My :  Jennifer Strong   P: [phone]  F: [phone]      CONTACTING YOUR SLEEP MEDICINE PROVIDER     Send a message directly to your provider through \"My Chart\", which is the email service through your [employer] Account: [email or web address]   Call [phone] and leave a message. One of the administrative assistants will forward the message to your sleep medicine provider through \"My Chart\" and/or email.     Your sleep medicine provider for this visit was: Brodie Kelley MD PhD        "

## 2025-06-16 NOTE — PROGRESS NOTES
Patient: Mahi Cuba    [id number]  : 1990 -- AGE 35 y.o.    Provider: Brodie Kelley MD PhD     Location [facility]   Service Date: 2025              [facility]  Followup Visit Note      Virtual or Telephone Consent  An interactive audio and video telecommunication system which permits real time communications between the patient (at the originating site) and provider (at the distant site) was utilized to provide this telehealth service.   Verbal consent was requested and obtained from Mahi Cuba on this date, 25 for a telehealth visit and the patient's location was confirmed at the time of the visit.      HISTORY OF PRESENT ILLNESS     The patient's referring provider is: No ref. provider found    HISTORY OF PRESENT ILLNESS   Mahi Cuba is a 35 y.o. female with past medical history of IH, chronic pain, fibromyalgia, anxiety/depression, RLS, RBD, asthma who presents to a [facility] for followup.       PAST SLEEP HISTORY  Patient is seen by Dr. Salima Philip. Last seen on 25. At the time was on modafinil but with side effects of irritability, panicking, emotional lability, insomnia. This resolved with stopping the modafinil. Patient had remained off of fluoxetine and was not having nightmares. She was taking THC gummies as needed for sleep onset insomnia. Intermittent RLS symptoms a few days/week. At 25 visit decided to start on Xywaxv.      Prior Sleep testing  PSG 2024: RDI3% 3.8 (Supine RDI3% 6.2, non-supine RDI3% 2), RDI4% 0.7 (Supine RDI4% 1.2, non-supine RDI4% 0.3), SpO2 emilie 94%, sleep efficiency 85.2%sleep latency 21 mins, REM latency 127.5 mins (med list showed fluoxetine and seroquel)     PSG 2025: RDI3% 4.8 (Supine RDI3% 5.9, non-supine RDI3% 1.3), RDI4% 1.5 (supine RDI4% 1.8, non-supine RDI4% 0.6), SpO2 emilie 92%, sleep latency 106 mins, REM latency 24.5 mins, sleep  efficiency 75.2%, TST 6.6 hours, N1 6.1%, N2 50.2%, N3 12.2%, REM 31.5%, arousal index 13.6 (mostly spontaneous), PLM index 0    MSLT 4/3/2025: MSL 9.9 mins, no SOREMP. Med list showed no meds. UDS was neg. Sleep log showed average sleep duration of 8.3 hours (range 7-10 hours) with average bedtime at 2 AM (range 1-4 AM), average sleep onset of 38 minutes, sleeps thru the night every night, and average final wake time of 10:30 AM to 11 AM (range 9 AM to 12 NN). Delayed sleep-wake disorder and sleep onset insomnia was noted as well as make-up sleep during off-work. 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 At night, she reports waking up at night but cannot recall how many times and at what time.      CURRENT HISTORY  Patient had been started on Xywav on 5/16/25. Seen by Sonia Flores on 6/2/25. Had started Xywav on 5/30/25 at 2.25g twice nightly. Described issues of nausea, decreased appetite, and tremors.  Similar issues on 5/31 night. Tool THC gummies, gabapentin, and clonidine on AM of 6/1 and was heavy and weak later in the day. Noted a low blood pressure of SBP 88 and went to ED but eventually sent home with no clear precipitant.    History of Present Illness  Mahi Cuba is a 35 year old female who presents with insomnia symptoms and anxiety at night.    Patient recently being treated for potential idiopathic hypersomnia by Dr. Sidhu and had been placed on Xywav.  However, as noted above she had issues of worsening nausea and tremors and stopped this.  She was worried that she might be undergoing SSRI withdrawal after stopping Prozac and spoke with her nurse practitioner mental health provider who may have tried a combination of clonidine with and without Seroquel with some benefits but having side effects of hypotension and presyncopal symptoms.  Patient had noted  "that when she was on Prozac that she had frequent nightmares and nonrestorative sleep which is improved since she stopped the Prozac.  She did a brief rechallenge and then had recurrence of the nightmares and so has stopped further use.  Off of Prozac she has had less issues of daytime hypersomnia and her sleep continuity is better, but she has difficulties falling asleep.  She says she has been off of Prozac approximately 18 weeks at this point.    Patient had been using occasional cannabinoid gummies with mixed success, with some concern whether that this was causing some worsening of her underlying anxiety.  In addition there is some concern that it might be contributing to her shakes and \"rage\" episodes.  Most recently she was being recommended to start mirtazapine at 15 mg but has been worried about this given the serotonergic component.  More recently she had tried Unisom without any significant benefits.    She reports since stopping the Prozac that she has had less issues of daytime hypersomnia and that her sleep continuity is somewhat better but has significant difficulties falling asleep.  She gets in bed by midnight and listens to different audio programs to help her fall asleep, but often does not fall asleep till 2 AM or later.  She sometimes feels the insomnia is worse around the time of her menstrual cycle.    She describes herself as a lifelong night owl, typically going to bed late and waking up around 10 AM. Attempts to adjust her sleep schedule have been unsuccessful. Insomnia and anxiety increase before her menstrual period, sometimes resulting in no sleep until 5 or 6 AM. She has stopped caffeine and avoids daytime naps to improve sleep quality.    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      Sleep schedule:    Bedtime: MN  Subjective sleep latency: > 120 minutes  Number of awakenings: once " asleep can stay asleep but sometimes does not fall asleep till 5AM  Falls back asleep right away  Final wake time: 10 AM  Out of bed time: 10AM   Has darkening shades in room    Naps: Has been avoiding      ESS: 4  RACHEL: 22  FOSQ: -      REVIEW OF SYSTEMS     REVIEW OF SYSTEMS  Review of Systems   All other systems reviewed and are negative.        ALLERGIES AND MEDICATIONS     ALLERGIES  Allergies[1]    MEDICATIONS: She has a current medication list which includes the following prescription(s): airsupra, clonidine - TAKE 1 TABLET BY MOUTH BEDTIME (AS NEEDED). MAY TAKE 1 TO 2 TABS FOR SLEEP, dicyclomine - TAKE 1 CAPSULE BY MOUTH 4 TIMES A DAY AS NEEDED, and ibuprofen - Take 2 capsules (400 mg) by mouth once daily as needed.    PAST MEDICAL HISTORY : She has Chronic pain associated with significant psychosocial dysfunction; Fibromyalgia; TENA (generalized anxiety disorder); Major depressive disorder; Idiopathic hypersomnia; RLS (restless legs syndrome); RBD (REM behavioral disorder); Circadian rhythm sleep-wake disorders, delayed sleep phase type; and Asthma with status asthmaticus, unspecified asthma severity, unspecified whether persistent ([facility]) on their problem list.    PAST SURGICAL HISTORY: She  has a past surgical history that includes Hernia repair (2015) and orthodontic treatment.     FAMILY HISTORY: No changes since previous visit. Otherwise non-contributory as charted.     SOCIAL HISTORY  She  reports that she has never smoked. She has never used smokeless tobacco. She reports current alcohol use. She reports that she does not use drugs.       PHYSICAL EXAM     Physical Examination:   No exam was performed since this was a video visit.      PREVIOUS WEIGHTS:  Wt Readings from Last 3 Encounters:   06/01/25 70.1 kg (154 lb 8 oz)   04/01/25 67.6 kg (149 lb)   01/10/25 73.1 kg (161 lb 1.6 oz)         RESULTS/DATA     Iron (ug/dL)   Date Value   12/05/2024 135   09/28/2024 68   08/12/2024 41     % Saturation  (%)   Date Value   12/05/2024 34   09/28/2024 17 (L)   08/12/2024 13 (L)     TIBC (ug/dL)   Date Value   12/05/2024 401   09/28/2024 406   08/12/2024 309     Ferritin (ng/mL)   Date Value   12/05/2024 85   09/28/2024 55   03/14/2024 26       CARBON DIOXIDE (mmol/L)   Date Value   04/01/2025 27     Bicarbonate (mmol/L)   Date Value   06/01/2025 25   11/20/2024 24   08/12/2024 27       DIAGNOSES     Problem List and Orders  Diagnoses and all orders for this visit:  Circadian rhythm sleep disorder, delayed sleep phase type  Chronic insomnia        ASSESSMENT/PLAN     Ms. Cuba is a 35 y.o. female and with past medical history of IH, chronic pain, fibromyalgia, anxiety/depression, RLS, RBD, asthma.  She returns in followup to  the [facility] for her current issues with insomnia and delayed sleep phase.    Assessment & Plan    Delayed Sleep Phase Syndrome  Seems like she has more of a  delayed sleep phase syndrome contributing to insomnia and daytime sleepiness. Tendency to sleep and wake later. Current sleep pattern does not suggest alignment with natural circadian rhythm and waking at 5AM for day may be too much for her at this time given her circadian phase. Plan includes adopting a sleep schedule that aligns with natural tendencies and using light therapy to gradually shift the circadian rhythm.  - Adopt a sleep schedule of 2 AM to 10 AM to align with natural sleep tendencies.  - Use dim lighting in the evening to help adjust circadian rhythm.  - Avoid bright light exposure in the evening.  - Keep a sleep diary and send in after 2 weeks  - Consider melatonin in the future if needed.    Insomnia - chronic primary vs. Secondary causes.  Chronic insomnia with difficulty initiating sleep, exacerbated by anxiety and potential withdrawal from long-term fluoxetine use. Current inconsistent sleep schedule, with attempts to sleep earlier increasing anxiety. Vivid nightmares associated with  fluoxetine resolved post-discontinuation. Current insomnia may be influenced by delayed sleep phase syndrome. Consideration of zolpidem as a temporary aid to reduce sleep-related anxiety, with emphasis on establishing a consistent sleep schedule to address potential delayed sleep phase syndrome.  - Initiate a sleep schedule of 2 AM to 10 AM.  - Use zolpidem 5 mg (can even try 2.5mg) as needed, no more than 2-3 times per week, to aid sleep onset.  - Maintain a sleep diary for two weeks and submit for review.  - Avoid daytime naps, but if necessary, limit to 20 minutes and before 5 PM.  - Avoid caffeine and THC use.    Previous Concern for IH  Given side effects on Xywav will pause for now. Consider if should do a retrial but start at lower dose if current interventions are not helping.    Anxiety  Anxiety contributing to insomnia, particularly nocturnally. Anxiety may be exacerbated by withdrawal from fluoxetine and THC use. Current management includes non-pharmacological interventions such as relaxation techniques.  - Continue non-pharmacological interventions such as relaxation techniques and avoiding stimulants.  - May still need assistance from her mental health provider.  - Considering mirtazapine but recommend holding on for now to see effects of Ambien and sleeping at a later time.  - may be beneficial for her.        Follow up: 4 weeks with Dr. Philip when she returns from leave. Patient to [facility] in 2 weeks         Brodie Kelley MD PhD          [1] No Known Allergies

## 2025-07-08 DIAGNOSIS — E83.10 DISORDER OF IRON METABOLISM: Primary | ICD-10-CM

## 2025-07-10 LAB
FERRITIN SERPL-MCNC: 26 NG/ML (ref 16–154)
IRON SATN MFR SERPL: 29 % (CALC) (ref 16–45)
IRON SERPL-MCNC: 102 MCG/DL (ref 40–190)
TIBC SERPL-MCNC: 354 MCG/DL (CALC) (ref 250–450)

## 2025-07-11 DIAGNOSIS — G25.81 RLS (RESTLESS LEGS SYNDROME): Primary | ICD-10-CM

## 2025-07-11 RX ORDER — FERROUS SULFATE 325(65) MG
65 TABLET ORAL
Qty: 180 TABLET | Refills: 0 | Status: SHIPPED | OUTPATIENT
Start: 2025-07-11 | End: 2025-10-09

## 2025-07-30 ENCOUNTER — OFFICE VISIT (OUTPATIENT)
Dept: SLEEP MEDICINE | Facility: CLINIC | Age: 35
End: 2025-07-30
Payer: COMMERCIAL

## 2025-07-30 DIAGNOSIS — F51.04 CHRONIC INSOMNIA: Primary | ICD-10-CM

## 2025-07-30 DIAGNOSIS — G47.52 RBD (REM BEHAVIORAL DISORDER): ICD-10-CM

## 2025-07-30 DIAGNOSIS — G47.11 IDIOPATHIC HYPERSOMNIA: ICD-10-CM

## 2025-07-30 DIAGNOSIS — G25.81 RLS (RESTLESS LEGS SYNDROME): ICD-10-CM

## 2025-07-30 PROCEDURE — 1036F TOBACCO NON-USER: CPT | Performed by: INTERNAL MEDICINE

## 2025-07-30 PROCEDURE — 99214 OFFICE O/P EST MOD 30 MIN: CPT | Performed by: INTERNAL MEDICINE

## 2025-07-30 PROCEDURE — 99214 OFFICE O/P EST MOD 30 MIN: CPT | Mod: 95 | Performed by: INTERNAL MEDICINE

## 2025-07-30 RX ORDER — HYDROXYZINE HYDROCHLORIDE 25 MG/1
25 TABLET, FILM COATED ORAL NIGHTLY
COMMUNITY

## 2025-07-30 RX ORDER — FERROUS SULFATE 325(65) MG
325 TABLET, DELAYED RELEASE (ENTERIC COATED) ORAL
COMMUNITY

## 2025-07-30 ASSESSMENT — COLUMBIA-SUICIDE SEVERITY RATING SCALE - C-SSRS
6. HAVE YOU EVER DONE ANYTHING, STARTED TO DO ANYTHING, OR PREPARED TO DO ANYTHING TO END YOUR LIFE?: NO
1. IN THE PAST MONTH, HAVE YOU WISHED YOU WERE DEAD OR WISHED YOU COULD GO TO SLEEP AND NOT WAKE UP?: NO
2. HAVE YOU ACTUALLY HAD ANY THOUGHTS OF KILLING YOURSELF?: NO

## 2025-07-30 ASSESSMENT — SLEEP AND FATIGUE QUESTIONNAIRES
HOW LIKELY ARE YOU TO NOD OFF OR FALL ASLEEP WHEN YOU ARE A PASSENGER IN A CAR FOR AN HOUR WITHOUT A BREAK: MODERATE CHANCE OF DOZING
HOW LIKELY ARE YOU TO NOD OFF OR FALL ASLEEP WHILE WATCHING TV: WOULD NEVER DOZE
ESS-CHAD TOTAL SCORE: 2
HOW LIKELY ARE YOU TO NOD OFF OR FALL ASLEEP WHILE SITTING AND TALKING TO SOMEONE: WOULD NEVER DOZE
HOW LIKELY ARE YOU TO NOD OFF OR FALL ASLEEP WHILE SITTING QUIETLY AFTER LUNCH WITHOUT ALCOHOL: WOULD NEVER DOZE
HOW LIKELY ARE YOU TO NOD OFF OR FALL ASLEEP WHILE SITTING AND READING: WOULD NEVER DOZE
HOW LIKELY ARE YOU TO NOD OFF OR FALL ASLEEP WHILE LYING DOWN TO REST IN THE AFTERNOON WHEN CIRCUMSTANCES PERMIT: WOULD NEVER DOZE
HOW LIKELY ARE YOU TO NOD OFF OR FALL ASLEEP IN A CAR, WHILE STOPPED FOR A FEW MINUTES IN TRAFFIC: WOULD NEVER DOZE
SITING INACTIVE IN A PUBLIC PLACE LIKE A CLASS ROOM OR A MOVIE THEATER: WOULD NEVER DOZE

## 2025-07-30 ASSESSMENT — PATIENT HEALTH QUESTIONNAIRE - PHQ9
SUM OF ALL RESPONSES TO PHQ9 QUESTIONS 1 AND 2: 0
2. FEELING DOWN, DEPRESSED OR HOPELESS: NOT AT ALL
1. LITTLE INTEREST OR PLEASURE IN DOING THINGS: NOT AT ALL

## 2025-07-30 ASSESSMENT — ENCOUNTER SYMPTOMS: DEPRESSION: 0

## 2025-07-30 ASSESSMENT — PAIN SCALES - GENERAL: PAINLEVEL_OUTOF10: 6

## 2025-07-30 NOTE — PROGRESS NOTES
[facility]  FOLLOW-UP VISIT NOTE    Virtual or Telephone Consent  An interactive audio and video telecommunication system which permits real time communications between the patient (at the originating site) and provider (at the distant site) was utilized to provide this telehealth service.   The patient was informed about the telehealth clinical encounter including benefits to avoiding travel, limitations of the assessment, and billing for the service. In-office care may be recommended if needed. Telehealth sessions are not being recorded and personal health information is protected. All questions were answered and verbal consent from the patient (or guardian) was obtained to proceed.     HISTORY OF PRESENT ILLNESS     Patient ID: Mahi Cuba is a 35 y.o. female who presents for follow-up of sleep disturbances    Patient is here alone today.  To review, patient's medical history is notable for seasonal allergies, RLS, RBD, nightmare disorder, delayed phase sleep-wake disorder, and idiopathic hypersomnia      Interval History  Patient was last seen in 6/16/2025 by Dr. Kelley. Plan was to start Ambien.  Patient has issues with lots of medications and consistently had sleep-onset and sleep-maintenance insomnia. Reviewed Dr. Kelley's notes as well as all my previous visit notes with patient. A summary of medications tried with corresponding side effects as follows:  Tried OCPs in last week of Aug 2024 which lead to increased nocturnal awakening as she was waking up 2x per night for unknown reasons but she feels energetic in daytime. Prior OCP intake, she sleeps thru the night.   Tried OCP and Plan B but she developed chest pain  She used to take Fluoxetine for depression and anxiety but developed frequent nightmares, acting out dreams (screaming in her sleep, karate-chopping her  while asleep), and sleep-maintenance insomnia. When she stopped Fluoxetine, nightmares, RBD, and  "sleep-maintenance insomnia had resolved with less issues of daytime hypersomnia. However, she still had difficulties falling asleep. She had a brief rechallenge of Fluoxetine, and subsequently, nightmares recurred. Currently, she is off-Fluoxetine  Tried Prazosin for nightmares but did not help  Tried weighted blanket for anxiety but it made panic attack worse.  Tried clonidine for sleep-onset insomnia but was having hypotension and pre-syncope  Tried Unisom for sleep-onset insomnia but did not help.  Tried prn cannabinoid gummies with mixed success. There was some concern that the gummies were worsening her underlying anxiety and might be contributing to her shakes and \"rage\" episodes.    Tried modafinil for potential IH but developed a lot of side effects such as extreme irritability, bugs under skin, emotional lability, waking up panicking (racing heart, feeling anxious) in the middle of sleep, and insomnia. When modafinil was stopped, irritability and sleep-maintenance insomnia had resolved.  Tried Xywav for potential IH but was having side effects such as palpitations, nausea, decreased appetite, and tremors.   Took gabapentin for fibromyalgia in the past and off-gabapentin for 10 years and did not recall what it did to her sleep. She tried gabapentin for Xywav-induced tremors but it did not help her.   Tried Ambien from Dr. Kelley which patient takes 2 nights per week and did not work.     In summary, patient describes herself as a lifelong night owl whether she drinks caffeine or not. She typically goes to bed late and can take many hours to fall asleep due to mind-racing. She denies worrying in bed but describes that her mind does not stop from thinking. Currently, she watches boring You-tube sleep videos (somebody talking about history in a monotonous voice) to distract her mind from racing and it seemed to help her fall asleep faster.     She usually wakes up at 11 AM and had severe and prolonged sleep " inertia with 7 alarms around her every morning but she keeps snoozing them all. She reports brain fog, cognitive impairment (ie not recalling if she already ate or drink meds), and long unrefreshing naps especially during her menstrual periods. She denies automatism. She had tried adjusting her delayed phase sleep schedule in the past but attempts were unsuccessful. Insomnia and anxiety appeared to increase before her menstrual period, sometimes resulting in no sleep until 5 or 6 AM. During menstrual period, she feels sleepy and tired and would often have long unrefreshing naps. Without menstruation, she is still tired but not napping in daytime.     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 Patient states that her grandmother had a recent fall episode; hence, she moved in to stay with her grandmother and subsequently, her bedtime and final wake time have been dependent on her grandmother's bedtime and final wake time. She tried to convince her grandmother to move to an assisted living facility. Lastly, she is having an upcoming vacation and would like to see if sleep quality will improve. She states that other family members will be covering her during her vacation.     RLS Follow-up:   No more RLS since iron was resumed    SLEEP STUDY HISTORY (personally reviewed raw data such as interpretation report, data sheet, hypnogram, and titration table if available and applicable)  PSG 12/9/2024: RDI3% 3.8 (Supine RDI3% 6.2, non-supine RDI3% 2), RDI4% 0.7 (Supine RDI4% 1.2, non-supine RDI4% 0.3), SpO2 emilie 94%, sleep efficiency 85.2%sleep latency 21 mins, REM latency 127.5 mins (med list showed fluoxetine and seroquel)   PSG 4/2/2025: RDI3% 4.8 (Supine RDI3% 5.9, non-supine RDI3% 1.3), RDI4% 1.5 (supine RDI4% 1.8, non-supine RDI4% 0.6), SpO2 emilie 92%, sleep latency 106 mins, REM latency 24.5 mins, sleep efficiency  75.2%, TST 6.6 hours, N1 6.1%, N2 50.2%, N3 12.2%, REM 31.5%, arousal index 13.6 (mostly spontaneous), PLM index 0  MSLT 4/3/2025: MSL 9.9 mins, no SOREMP. Med list showed no meds. UDS was neg. Sleep log showed average sleep duration of 8.3 hours (range 7-10 hours) with average bedtime at 2 AM (range 1-4 AM), average sleep onset of 38 minutes, sleeps thru the night every night, and average final wake time of 10:30 AM to 11 AM (range 9 AM to 12 NN). Delayed sleep-wake disorder and sleep onset insomnia was noted as well as make-up sleep during off-work. 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 At night, she reports waking up at night but cannot recall how many times and at what time.      SLEEP-WAKE SCHEDULE  Bedtime: 10 PM to 1 AM daily depending on grandmother's bedtime  Subjective sleep latency: 30-60 minutes  Difficulty falling asleep: yes due to mind-racing / rumination  Number of awakenings: 1-2 times per night spontaneously for unknown reasons  Nocturia: No  Falls back asleep in 10-20 minutes  Final wake time: 8 AM to 9:30 AM daily depending on grandmother's wake time  Out of bed time: 8 AM to 9:30 AM daily  Shift work: No   Naps: no  Average sleep duration (excluding naps): 5-6 hours    SLEEP ENVIRONMENT  Sleep location: bed  Sleep status: sleeps with   Preferred sleep position: side    SOCIAL HISTORY  Smoking: no  ETOH: no  Marijuana: no  Caffeine: quit in months then resumed 1 week ago when taking care of grandmother (90 mg caffeine daily in the morning)  Sleep aids: yes on hydroxyzine    WEIGHT: stable    REVIEW OF SYSTEMS     All other systems have been reviewed and are negative.    ALLERGIES     Allergies[1]    MEDICATIONS     Current Medications[2]    Active Problems, Allergy List, Medication List, and PMH/PSH/FH/Social Hx have been reviewed and  reconciled in chart. No significant changes unless documented in the pertinent chart section. Updates made when necessary.       PHYSICAL EXAM     VITAL SIGNS: There were no vitals taken for this visit.    Today ESS: 2  Last visit ESS: 12  Last visit RACHEL: 20      RESULTS/DATA     IRON, TOTAL (mcg/dL)   Date Value   07/10/2025 102     Iron (ug/dL)   Date Value   12/05/2024 135   09/28/2024 68     % SATURATION (% (calc))   Date Value   07/10/2025 29     % Saturation (%)   Date Value   12/05/2024 34   09/28/2024 17 (L)     IRON BINDING CAPACITY (mcg/dL (calc))   Date Value   07/10/2025 354     TIBC (ug/dL)   Date Value   12/05/2024 401   09/28/2024 406     FERRITIN (ng/mL)   Date Value   07/10/2025 26     Ferritin (ng/mL)   Date Value   12/05/2024 85   09/28/2024 55       CARBON DIOXIDE   Date Value Ref Range Status   04/01/2025 27 20 - 32 mmol/L Final     Bicarbonate   Date Value Ref Range Status   06/01/2025 25 21 - 32 mmol/L Final       ASSESSMENT/PLAN     Mahi Cuba is a 35 y.o. female who returns in [facility] for the following problems:    CHRONIC INSOMNIA, SLEEP-ONSET and SLEEP-MAINTENANCE  - Sleep-onset insomnia is likely due to poor sleep habits (mind-racing, spending too much time in bed, electronics in bed, ? clock-watching), psychophysiologic, delayed sleep phase disorder, stress, and mood disorders (anxiety, depression)  - Sleep maintenance insomnia is probably due to borderline positional BARBARA, and mood disorders (anxiety, depression)  - Of note, insomnia and anxiety worsens days before menstrual period. Then she starts to feel sleepy in daytime during menstrual period.  - Per patient, watching boring You-tube videos seemed to help with her sleep-onset insomnia. She also takes hydroxyzine 25 mg at bedtime which seemed to help with her sleep-onset insomnia.  - Agree with getting a vacation and observe her sleep quality.  - Recommend trying meditation or Yoga as  well.    DELAYED PHASE SLEEP-WAKE DISORDER  - Discussed strategically timed evening melatonin and morning bright light therapy   - Discussed chronotherapy in detail such as progressive phase delay, progressive phase advance, and schedule shift technique.   - Per patient, she tried some of the above chronotherapy in the past but it did not help.    BORDERLINE OBSTRUCTIVE SLEEP APNEA, positional (PSG RDI3% 4.8, RDI4% 1.5)  - Reviewed the 2 PSGs done and they both consistently showed mild BARBARA only on supine sleep. However, overall RDI3% or RDI4% was less than 5.0; hence, I am not able to prescribe any treatment.  - Highly recommend strict positional therapy.  - Supportive management as follows: Stay off your back when sleeping. Avoid smoking, alcohol, and sedating medications if applicable. Don't drive when sleepy.    PERSISTENT FATIGUE   - probably due to untreated fibromyalgia vs chronic fatigue syndrome (CFS) vs caregiver stress  - Per patient, she used to take gabapentin for fibromyalgia and did not recall how it affects her sleep. However, she is off-gabapentin for 10 years now. Unsure about the reason why gabapentin was stopped  - Of note, patient is tired all day with or without menstruation.   - Advised patient to see PCP and reevaluate for fibromyalgia vs CFS.  - If fibromyalgia is present, consider trying gabapentin or pregabalin as these medications can also help with insomnia.    RESTLESS LEG SYNDROME  - 2025 vitamin D 43, ferritin 26, TSAT 29%  - Continue ferrous sulfate 325 mg BID.   - No RLS symptoms while on oral iron pills.  - Recheck ferritin and TSAT in 3 months  - Supportive management: Avoid triggers of RLS such as caffeine, alcohol, nicotine, medications, and low iron. Taper off or reduce dose of medications that can cause RLS such as but not limited ot antidepressants except Buproprion and Trazodone, 1st generation antihistamines, antipsychotics, anti-emetics except ondansetron, melatonin,  topiramate, etc. Consider switch antidepressants to Bupropion if feasible. May take warm bath 2 hours before bedtime. Massage and/or stretch legs while in bed    REM SLEEP BEHAVIOR DISORDER  - Used to karate-chop her  in bed while sleeping or scream in her sleep  - Likely due to medications such as fluoxetine and Seroquel  - Continue safety precautions of sleeping environment.     SEASONAL ALLERGIES   - May take fluticasone nasal spray 2 sprays per nostril once daily 1 hour before bedtime.  - May take Azelastine nasal spray, 2 sprays per nostril once daily in the morning.       Follow-up in 6 months.      All of patient's questions were answered. She verbalizes understanding and agreement with my assessment and plan. Refer to after-visit-summary (AVS) for patient education and if applicable, for more details on troubleshooting issues with PAP usage, proper cleaning instructions of PAP supplies, and usual recommended replacement schedule for each of the PAP supplies.            [1] No Known Allergies  [2]   Current Outpatient Medications   Medication Sig Dispense Refill    ferrous sulfate 325 (65 Fe) mg EC tablet Take 1 tablet by mouth 2 times a day before meals. Do not crush, chew, or split.      hydrOXYzine HCL (Atarax) 25 mg tablet Take 1 tablet (25 mg) by mouth once daily at bedtime.       No current facility-administered medications for this visit.

## 2025-08-14 ENCOUNTER — APPOINTMENT (OUTPATIENT)
Dept: ORTHOPEDIC SURGERY | Facility: CLINIC | Age: 35
End: 2025-08-14
Payer: COMMERCIAL

## 2025-08-15 ENCOUNTER — APPOINTMENT (OUTPATIENT)
Dept: ORTHOPEDIC SURGERY | Facility: CLINIC | Age: 35
End: 2025-08-15
Payer: COMMERCIAL

## 2025-08-15 DIAGNOSIS — M25.561 RIGHT KNEE PAIN, UNSPECIFIED CHRONICITY: ICD-10-CM

## 2025-08-22 ENCOUNTER — APPOINTMENT (OUTPATIENT)
Dept: ORTHOPEDIC SURGERY | Facility: CLINIC | Age: 35
End: 2025-08-22
Payer: COMMERCIAL

## 2025-08-29 ENCOUNTER — APPOINTMENT (OUTPATIENT)
Dept: ORTHOPEDIC SURGERY | Facility: CLINIC | Age: 35
End: 2025-08-29
Payer: COMMERCIAL

## 2025-09-05 ENCOUNTER — APPOINTMENT (OUTPATIENT)
Dept: ORTHOPEDIC SURGERY | Facility: CLINIC | Age: 35
End: 2025-09-05
Payer: COMMERCIAL

## 2025-09-19 ENCOUNTER — APPOINTMENT (OUTPATIENT)
Dept: ORTHOPEDIC SURGERY | Facility: CLINIC | Age: 35
End: 2025-09-19
Payer: COMMERCIAL

## 2025-10-14 ENCOUNTER — APPOINTMENT (OUTPATIENT)
Dept: PRIMARY CARE | Facility: CLINIC | Age: 35
End: 2025-10-14
Payer: COMMERCIAL